# Patient Record
Sex: MALE | Race: WHITE | NOT HISPANIC OR LATINO | Employment: OTHER | URBAN - METROPOLITAN AREA
[De-identification: names, ages, dates, MRNs, and addresses within clinical notes are randomized per-mention and may not be internally consistent; named-entity substitution may affect disease eponyms.]

---

## 2017-01-16 ENCOUNTER — TELEPHONE (OUTPATIENT)
Dept: NEUROLOGY | Facility: CLINIC | Age: 47
End: 2017-01-16

## 2017-01-16 NOTE — TELEPHONE ENCOUNTER
----- Message from Sheriheather Long sent at 1/16/2017 11:55 AM CST -----  _x  1st Request  _  2nd Request  _  3rd Request        Who:  Dodie Pinon    Why: Dr. Jay Jay Bryson spoke to Dr. Taylor about getting the patient in as soon as possible to see him. Please contact Mrs. Dodie Pinon. To schedule     What Number to Call Back: [phone]    When to Expect a call back: (Before the end of the day)   -- if call after 3:00 call back will be tomorrow.

## 2017-01-17 ENCOUNTER — OFFICE VISIT (OUTPATIENT)
Dept: NEUROLOGY | Facility: CLINIC | Age: 47
End: 2017-01-17
Payer: COMMERCIAL

## 2017-01-17 VITALS
DIASTOLIC BLOOD PRESSURE: 65 MMHG | SYSTOLIC BLOOD PRESSURE: 99 MMHG | WEIGHT: 256.81 LBS | HEIGHT: 75 IN | BODY MASS INDEX: 31.93 KG/M2 | HEART RATE: 62 BPM

## 2017-01-17 DIAGNOSIS — I10 ESSENTIAL HYPERTENSION: ICD-10-CM

## 2017-01-17 DIAGNOSIS — R41.3 AMNESTIC DISORDER: ICD-10-CM

## 2017-01-17 PROCEDURE — 99204 OFFICE O/P NEW MOD 45 MIN: CPT | Mod: S$GLB,,, | Performed by: PSYCHIATRY & NEUROLOGY

## 2017-01-17 PROCEDURE — 3078F DIAST BP <80 MM HG: CPT | Mod: S$GLB,,, | Performed by: PSYCHIATRY & NEUROLOGY

## 2017-01-17 PROCEDURE — 99999 PR PBB SHADOW E&M-EST. PATIENT-LVL III: CPT | Mod: PBBFAC,,, | Performed by: PSYCHIATRY & NEUROLOGY

## 2017-01-17 PROCEDURE — 1159F MED LIST DOCD IN RCRD: CPT | Mod: S$GLB,,, | Performed by: PSYCHIATRY & NEUROLOGY

## 2017-01-17 PROCEDURE — 3074F SYST BP LT 130 MM HG: CPT | Mod: S$GLB,,, | Performed by: PSYCHIATRY & NEUROLOGY

## 2017-01-17 RX ORDER — LISINOPRIL 10 MG/1
10 TABLET ORAL DAILY
Refills: 0 | COMMUNITY
Start: 2017-01-04 | End: 2021-07-06

## 2017-01-17 RX ORDER — ZIPRASIDONE HYDROCHLORIDE 40 MG/1
80 CAPSULE ORAL NIGHTLY
Refills: 0 | COMMUNITY
Start: 2017-01-04 | End: 2021-07-06

## 2017-01-17 NOTE — LETTER
January 17, 2017      Jay Jay Bryson MD  [address]  [facility] [address]           [facility] - Neurology  [address]  [facility] [address]  Phone: [phone]  Fax: [phone]          Patient: Jeffrey Pinon   MR Number: [id number]   YOB: 1970   Date of Visit: 1/17/2017       Dear Dr. Jay Jay Bryson:    Thank you for referring Jeffrey Pinon to me for evaluation. Attached you will find relevant portions of my assessment and plan of care.    If you have questions, please do not hesitate to call me. I look forward to following Jeffrey Pinon along with you.    Sincerely,    Nik Francois MD    Enclosure  CC:  No Recipients    If you would like to receive this communication electronically, please contact [email or web address] or [phone] to request more information on [facility]methods Link access.    For providers and/or their staff who would like to refer a patient to [facility], please contact us through our one-stop-shop provider referral line, [facility], at [phone].    If you feel you have received this communication in error or would no longer like to receive these types of communications, please e-mail [email or web address]

## 2017-01-17 NOTE — PROGRESS NOTES
Subjective:       Patient ID: Jeffrey Pinon is a 46 y.o. male.    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  The patient was initially referred to Dr. Taylor, however could not be seen by him today because of an emergency and was seen by me instead.  The patient comes to the clinic alone initially, and is the primary historian.  His mother was present in the emergency room.  He reports that September 2016 he was noted to have been sending inappropriate texts that he cannot recall sending.  He was subsequently evaluated for this.  He reports having seen a neurologist in [place] and had a complete workup done including blood work, MRI scan of brain without contrast and an EEG, which the patient reports were normal.  He was subsequently seen by psychiatry and admitted for an evaluation.  He reports having had neuropsychological testing done the results of which are not available.  He did try to go back to work however the same pattern of behavior had recurred and it texting ability on his phone was removed.  The patient is presently under the care of a psychiatrist, Dr. Jay Jay Bella, and is on [facility].  No medical records were available for review.  Because of the unique nature of symptoms and age of onset, a psychiatrist wanted a more thorough neurological evaluation and possibly a spinal tap.  The patients mother was also interviewed after the patients examination and she collaborated with the patients history.  The patient has otherwise been in good health.  He does have history of hypertension that is well controlled on lisinopril.  He is presently .  He lives alone in a condo in [place] at this time.  He has not yet returned to work.       Review of Systems  Review of Systems   Constitutional: Negative.    HENT: Negative.  Negative for hearing loss.     Eyes: Negative.  Negative for visual disturbance.   Respiratory: Negative.  Negative for shortness of breath.    Cardiovascular: Negative.  Negative for chest pain and palpitations.   Gastrointestinal: Negative.    Endocrine: Negative.    Genitourinary: Negative.    Musculoskeletal: Negative.  Negative for back pain and gait problem.   Skin: Negative.    Allergic/Immunologic: Negative.    Neurological: Negative.  Negative for dizziness, tremors, seizures, syncope, speech difficulty, weakness, numbness and headaches.   Hematological: Negative.    Psychiatric/Behavioral: Negative.  Negative for decreased concentration and sleep disturbance.       Objective:      Neurologic Exam     Mental Status   Oriented to person, place, and time.   Registration: recalls 3 of 3 objects. Follows 3 step commands.   Attention: normal. Concentration: normal.   Speech: speech is normal   Level of consciousness: alert  Knowledge: good.   Able to name object. Able to read. Able to repeat. Able to write. Normal comprehension.     Cranial Nerves   Cranial nerves II through XII intact.     Motor Exam   Muscle bulk: normal  Overall muscle tone: normal    Strength   Strength 5/5 throughout.     Sensory Exam   Light touch normal.   Vibration normal.   Proprioception normal.   Pinprick normal.     Gait, Coordination, and Reflexes     Gait  Gait: normal    Coordination   Romberg: negative  Finger to nose coordination: normal  Heel to shin coordination: normal  Tandem walking coordination: normal    Tremor   Resting tremor: absent  Intention tremor: absent  Action tremor: absent    Reflexes   Right brachioradialis: 2+  Left brachioradialis: 2+  Right biceps: 2+  Left biceps: 2+  Right triceps: 2+  Left triceps: 2+  Right patellar: 2+  Left patellar: 2+  Right achilles: 2+  Left achilles: 2+  Right plantar: normal  Left plantar: normal      Physical Exam   Constitutional: He is oriented to person, place, and time. He appears well-developed and  well-nourished.   HENT:   Head: Normocephalic and atraumatic.   Neck: Normal range of motion. Neck supple.   Neurological: He is oriented to person, place, and time. He has normal strength. He has a normal Finger-Nose-Finger Test, a normal Heel to Shin Test, a normal Romberg Test and a normal Tandem Gait Test. Gait normal.   Reflex Scores:       Tricep reflexes are 2+ on the right side and 2+ on the left side.       Bicep reflexes are 2+ on the right side and 2+ on the left side.       Brachioradialis reflexes are 2+ on the right side and 2+ on the left side.       Patellar reflexes are 2+ on the right side and 2+ on the left side.       Achilles reflexes are 2+ on the right side and 2+ on the left side.  Psychiatric: His speech is normal.   Vitals reviewed.        Assessment:        1. Amnestic disorder    2. Essential hypertension            Plan:         Discussed with patient and mother.  Will get an MRI scan of the brain with contrast which is only test that has not been done.  We will get results of the MRI scan without contrast and the EEG done by his neurologist, and blood work done by his neurologist at that time.  Also advised patient to get a copy of his neuropsychological testing results faxed to the office.  The patient will follow-up with Dr. Taylor in 2-4 weeks after medical records are available for review, for further investigation if appropriate.

## 2017-01-17 NOTE — PATIENT INSTRUCTIONS
Discussed with patient and mother.  Will get an MRI scan of the brain with contrast which is only test that has not been done.  We will get results of the MRI scan without contrast and the EEG done by his neurologist, and blood work done by his neurologist at that time.  Also advised patient to get a copy of his neuropsychological testing results faxed to the office.  The patient will follow-up with Dr. Taylor in 2-4 weeks after medical records are available for review, for further investigation if appropriate.

## 2017-01-19 ENCOUNTER — TELEPHONE (OUTPATIENT)
Dept: NEUROLOGY | Facility: CLINIC | Age: 47
End: 2017-01-19

## 2017-01-19 DIAGNOSIS — R41.3 AMNESTIC DISORDER: Primary | ICD-10-CM

## 2017-01-19 NOTE — TELEPHONE ENCOUNTER
----- Message from Stefany Mao sent at 1/19/2017  3:20 PM CST -----  Contact: Carmen with Radiology  X   1st Request  _  2nd Request  _  3rd Request        Who:  Carmen with Radiology    Why: Carmen states pt needs a Bun/Creatinine Lab work. Pt has an MRI appt on 01/23. Please call with any questions, thanks!    What Number to Call Back: Ext [phone]    When to Expect a call back: (Before the end of the day)   -- if call after 3:00 call back will be tomorrow.

## 2017-01-24 ENCOUNTER — TELEPHONE (OUTPATIENT)
Dept: NEUROLOGY | Facility: CLINIC | Age: 47
End: 2017-01-24

## 2017-01-24 NOTE — TELEPHONE ENCOUNTER
----- Message from Kimmy Yuan sent at 1/24/2017 10:00 AM CST -----  Contact: Dodie Pinon (mother)  _x  1st Request  _  2nd Request  _  3rd Request        Who: Dodie Pinon (mother)    Why: patient's mother would like to reschedule MRI and labs. Please give patient's mother a call back at your earliest convenience. Thanks!     What Number to Call Back [phone]    When to Expect a call back: (Before the end of the day)   -- if call after 3:00 call back will be tomorrow.

## 2017-01-25 ENCOUNTER — HOSPITAL ENCOUNTER (OUTPATIENT)
Dept: RADIOLOGY | Facility: OTHER | Age: 47
Discharge: HOME | End: 2017-01-25
Attending: PSYCHIATRY & NEUROLOGY
Payer: COMMERCIAL

## 2017-01-25 DIAGNOSIS — R41.3 AMNESTIC DISORDER: ICD-10-CM

## 2017-01-25 PROCEDURE — A9585 GADOBUTROL INJECTION: HCPCS | Performed by: PSYCHIATRY & NEUROLOGY

## 2017-01-25 PROCEDURE — 70553 MRI BRAIN STEM W/O & W/DYE: CPT | Mod: 26,,, | Performed by: RADIOLOGY

## 2017-01-25 PROCEDURE — 25500020 PHARM REV CODE 255: Performed by: PSYCHIATRY & NEUROLOGY

## 2017-01-25 PROCEDURE — 70553 MRI BRAIN STEM W/O & W/DYE: CPT | Mod: TC

## 2017-01-25 RX ORDER — GADOBUTROL 604.72 MG/ML
10 INJECTION INTRAVENOUS
Status: COMPLETED | OUTPATIENT
Start: 2017-01-25 | End: 2017-01-25

## 2017-01-25 RX ADMIN — GADOBUTROL 10 ML: 604.72 INJECTION INTRAVENOUS at 03:01

## 2017-01-26 ENCOUNTER — TELEPHONE (OUTPATIENT)
Dept: NEUROLOGY | Facility: CLINIC | Age: 47
End: 2017-01-26

## 2017-07-18 ENCOUNTER — HOSPITAL ENCOUNTER (EMERGENCY)
Facility: HOSPITAL | Age: 47
End: 2017-07-19
Attending: EMERGENCY MEDICINE
Payer: COMMERCIAL

## 2017-07-18 VITALS
DIASTOLIC BLOOD PRESSURE: 62 MMHG | HEART RATE: 63 BPM | TEMPERATURE: 98 F | BODY MASS INDEX: 26.61 KG/M2 | SYSTOLIC BLOOD PRESSURE: 119 MMHG | RESPIRATION RATE: 16 BRPM | OXYGEN SATURATION: 98 % | WEIGHT: 214 LBS | HEIGHT: 75 IN

## 2017-07-18 DIAGNOSIS — R45.851 SUICIDAL IDEATION: Primary | ICD-10-CM

## 2017-07-18 LAB
ALBUMIN SERPL BCP-MCNC: 3.7 G/DL
ALP SERPL-CCNC: 43 U/L
ALT SERPL W/O P-5'-P-CCNC: 18 U/L
AMPHET+METHAMPHET UR QL: NEGATIVE
ANION GAP SERPL CALC-SCNC: 10 MMOL/L
APAP SERPL-MCNC: <3 UG/ML
AST SERPL-CCNC: 13 U/L
BARBITURATES UR QL SCN>200 NG/ML: NEGATIVE
BASOPHILS # BLD AUTO: 0 K/UL
BASOPHILS NFR BLD: 0.4 %
BENZODIAZ UR QL SCN>200 NG/ML: NEGATIVE
BILIRUB SERPL-MCNC: 0.3 MG/DL
BILIRUB UR QL STRIP: NEGATIVE
BUN SERPL-MCNC: 14 MG/DL
BZE UR QL SCN: NEGATIVE
CALCIUM SERPL-MCNC: 8.8 MG/DL
CANNABINOIDS UR QL SCN: NEGATIVE
CHLORIDE SERPL-SCNC: 105 MMOL/L
CLARITY UR: CLEAR
CO2 SERPL-SCNC: 24 MMOL/L
COLOR UR: YELLOW
CREAT SERPL-MCNC: 1 MG/DL
CREAT UR-MCNC: 98.8 MG/DL
DIFFERENTIAL METHOD: ABNORMAL
EOSINOPHIL # BLD AUTO: 0.1 K/UL
EOSINOPHIL NFR BLD: 0.9 %
ERYTHROCYTE [DISTWIDTH] IN BLOOD BY AUTOMATED COUNT: 12.5 %
EST. GFR  (AFRICAN AMERICAN): >60 ML/MIN/1.73 M^2
EST. GFR  (NON AFRICAN AMERICAN): >60 ML/MIN/1.73 M^2
ETHANOL SERPL-MCNC: <10 MG/DL
GLUCOSE SERPL-MCNC: 98 MG/DL
GLUCOSE UR QL STRIP: NEGATIVE
HCT VFR BLD AUTO: 38.9 %
HGB BLD-MCNC: 13.4 G/DL
HGB UR QL STRIP: NEGATIVE
KETONES UR QL STRIP: NEGATIVE
LEUKOCYTE ESTERASE UR QL STRIP: NEGATIVE
LYMPHOCYTES # BLD AUTO: 1.6 K/UL
LYMPHOCYTES NFR BLD: 17.5 %
MCH RBC QN AUTO: 31.1 PG
MCHC RBC AUTO-ENTMCNC: 34.4 %
MCV RBC AUTO: 90 FL
METHADONE UR QL SCN>300 NG/ML: NEGATIVE
MONOCYTES # BLD AUTO: 0.5 K/UL
MONOCYTES NFR BLD: 5.3 %
NEUTROPHILS # BLD AUTO: 7 K/UL
NEUTROPHILS NFR BLD: 75.9 %
NITRITE UR QL STRIP: NEGATIVE
OPIATES UR QL SCN: NEGATIVE
PCP UR QL SCN>25 NG/ML: NEGATIVE
PH UR STRIP: 6 [PH] (ref 5–8)
PLATELET # BLD AUTO: 222 K/UL
PMV BLD AUTO: 7.4 FL
POTASSIUM SERPL-SCNC: 3.6 MMOL/L
PROT SERPL-MCNC: 6.4 G/DL
PROT UR QL STRIP: NEGATIVE
RBC # BLD AUTO: 4.3 M/UL
SALICYLATES SERPL-MCNC: <5 MG/DL
SODIUM SERPL-SCNC: 139 MMOL/L
SP GR UR STRIP: 1.01 (ref 1–1.03)
TOXICOLOGY INFORMATION: NORMAL
TSH SERPL DL<=0.005 MIU/L-ACNC: 0.76 UIU/ML
URN SPEC COLLECT METH UR: NORMAL
UROBILINOGEN UR STRIP-ACNC: NEGATIVE EU/DL
WBC # BLD AUTO: 9.2 K/UL

## 2017-07-18 PROCEDURE — 80329 ANALGESICS NON-OPIOID 1 OR 2: CPT

## 2017-07-18 PROCEDURE — 84443 ASSAY THYROID STIM HORMONE: CPT

## 2017-07-18 PROCEDURE — 25000003 PHARM REV CODE 250: Performed by: EMERGENCY MEDICINE

## 2017-07-18 PROCEDURE — 99285 EMERGENCY DEPT VISIT HI MDM: CPT

## 2017-07-18 PROCEDURE — 85025 COMPLETE CBC W/AUTO DIFF WBC: CPT

## 2017-07-18 PROCEDURE — 81003 URINALYSIS AUTO W/O SCOPE: CPT

## 2017-07-18 PROCEDURE — 36415 COLL VENOUS BLD VENIPUNCTURE: CPT

## 2017-07-18 PROCEDURE — 80307 DRUG TEST PRSMV CHEM ANLYZR: CPT

## 2017-07-18 PROCEDURE — 93010 ELECTROCARDIOGRAM REPORT: CPT | Mod: ,,, | Performed by: INTERNAL MEDICINE

## 2017-07-18 PROCEDURE — 80320 DRUG SCREEN QUANTALCOHOLS: CPT

## 2017-07-18 PROCEDURE — 80053 COMPREHEN METABOLIC PANEL: CPT

## 2017-07-18 PROCEDURE — 93005 ELECTROCARDIOGRAM TRACING: CPT

## 2017-07-18 RX ORDER — ZIPRASIDONE HYDROCHLORIDE 20 MG/1
80 CAPSULE ORAL
Status: COMPLETED | OUTPATIENT
Start: 2017-07-18 | End: 2017-07-18

## 2017-07-18 RX ORDER — FLUOXETINE 10 MG/1
10 CAPSULE ORAL DAILY
COMMUNITY
End: 2021-07-06

## 2017-07-18 RX ADMIN — ZIPRASIDONE HYDROCHLORIDE 80 MG: 20 CAPSULE ORAL at 10:07

## 2017-07-18 NOTE — ED PROVIDER NOTES
Encounter Date: 7/18/2017    SCRIBE #1 NOTE: I, Pam Snyder, am scribing for, and in the presence of, Dr. Camara.       History     Chief Complaint   Patient presents with    Psychiatric Evaluation     Severe anxiety  from haldol injection he received last week. Suicidal ideations     07/18/2017  6:45 PM     Chief Complaint: Psychiatric Evaluation       The patient is a 47 y.o. male who is presenting to ED for psychiatric evaluation today. Pt reports that he received 28 day dose of Haldol injection due to delusion disorder since last week. Pt's father reports that pt was dx with delusional disorder in 10/2016. He reports anxiety since injection. He reports SI without plan since two days ago. He denies HI or auditory hallucination. He denies prior hx of self-injury. Pt was seen by his psychiatrist yesterday and started pt on Prozac. Pt has a PMHx of delusional disorder and HTN. Pt has no PSHx on file.        The history is provided by the patient and a parent.     Review of patient's allergies indicates:   Allergen Reactions    Warfarin      Past Medical History:   Diagnosis Date    Delusional disorder     Hypertension      History reviewed. No pertinent surgical history.  Family History   Problem Relation Age of Onset    Hypertension Father     Clotting disorder Father      Protein C deficiency     Social History   Substance Use Topics    Smoking status: Never Smoker    Smokeless tobacco: Never Used    Alcohol use No     Review of Systems   Constitutional: Negative for fever.   HENT: Negative for sore throat.    Respiratory: Negative for shortness of breath.    Cardiovascular: Negative for chest pain.   Gastrointestinal: Negative for nausea.   Genitourinary: Negative for dysuria.   Musculoskeletal: Negative for back pain.   Skin: Negative for rash.   Neurological: Negative for weakness.   Hematological: Does not bruise/bleed easily.   Psychiatric/Behavioral: Positive for suicidal ideas. Negative for  hallucinations and self-injury. The patient is nervous/anxious.        Physical Exam     Initial Vitals [07/18/17 1819]   BP Pulse Resp Temp SpO2   (!) 154/86 74 16 98.3 °F (36.8 °C) 99 %      MAP       108.67         Physical Exam    Nursing note and vitals reviewed.  Constitutional: He appears well-developed and well-nourished. He is not diaphoretic. No distress.   HENT:   Head: Normocephalic and atraumatic.   Eyes: EOM are normal. Pupils are equal, round, and reactive to light.   Neck: Normal range of motion. Neck supple.   Cardiovascular: Normal rate, regular rhythm, normal heart sounds and intact distal pulses.   Pulmonary/Chest: Breath sounds normal. No respiratory distress. He has no wheezes. He has no rhonchi. He has no rales.   Abdominal: Soft. Bowel sounds are normal. There is no tenderness. There is no rebound and no guarding.   Musculoskeletal: Normal range of motion.   Neurological: He is alert and oriented to person, place, and time.   Skin: Skin is warm.   Psychiatric:   Flat affect.          ED Course   Procedures  Labs Reviewed   CBC W/ AUTO DIFFERENTIAL - Abnormal; Notable for the following:        Result Value    RBC 4.30 (*)     Hemoglobin 13.4 (*)     Hematocrit 38.9 (*)     MCH 31.1 (*)     MPV 7.4 (*)     Gran% 75.9 (*)     Lymph% 17.5 (*)     All other components within normal limits   COMPREHENSIVE METABOLIC PANEL - Abnormal; Notable for the following:     Alkaline Phosphatase 43 (*)     All other components within normal limits   ACETAMINOPHEN LEVEL - Abnormal; Notable for the following:     Acetaminophen (Tylenol), Serum <3.0 (*)     All other components within normal limits   SALICYLATE LEVEL - Abnormal; Notable for the following:     Salicylate Lvl <5.0 (*)     All other components within normal limits   TSH   URINALYSIS   DRUG SCREEN PANEL, URINE EMERGENCY   ALCOHOL,MEDICAL (ETHANOL)             Medical Decision Making:   Initial Assessment:   This is an emergent evaluation of an 47  y.o. presenting with psychiatric evaluation.   Differential Diagnosis:   My differential diagnosis includes SI, Medication Reaction, Alcohol Intoxication, and Drug Intoxication.   ED Management:  The patient was emergently evaluated in the ED, his evaluation was significant for a middle-aged male who is expressing active suicidal thoughts.  The patient was placed under the care of a physician's emergency certificate for his safety.  The patient's EKG showed no acute abnormalities per my independent interpretation.  The patient's labs showed no acute abnormalities.  The patient's diagnosis is suicidal ideation.  I believe the patient will require psychiatric evaluation and treatment.  He is medically cleared for placement into an inpatient psychiatric facility.            Scribe Attestation:   Scribe #1: I performed the above scribed service and the documentation accurately describes the services I performed. I attest to the accuracy of the note.    Attending Attestation:           Physician Attestation for Scribe:  Physician Attestation Statement for Scribe #1: I, Dr. Camara, reviewed documentation, as scribed by Pam Snyder in my presence, and it is both accurate and complete.                 ED Course     Clinical Impression:     1. Suicidal ideation                                 Lamont Camara MD  07/18/17 5865

## 2017-07-19 NOTE — ED NOTES
Admit packet faxed to the following facilities:  [facility], [facility], [facility], [facility], [facility], [facility], [facility], [facility], [facility], [facility], [facility], [facility], [facility], [facility], [facility], [facility], [facility], [facility], [facility], [facility], [facility], [facility], [facility], [facility], [facility], [facility], [facility], [facility] and [facility].

## 2017-07-19 NOTE — ED NOTES
Patient accepted to [facility] in [place].  Accepting Md is Dr. Kuo. Call report in 30 minutes to [phone].

## 2017-07-19 NOTE — ED NOTES
Pt reports recent injection of haldol and 2 days later began feeling suicidal. Pt has no definite plan, just feelings. No hallucinations auditory or visual. Pt has been treated for delusional disorder in the past. Pt has had this same type reaction to his first injection of haldol and physician was aware according to pt. This was his second haldol injection. 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 Pt calm and cooperative at this time. Pt refuses to void as he says that is a reaction to the haldol. Pt requesting sedation MD advised.

## 2021-06-24 ENCOUNTER — TELEPHONE (OUTPATIENT)
Dept: FAMILY MEDICINE | Facility: CLINIC | Age: 51
End: 2021-06-24

## 2021-07-06 ENCOUNTER — OFFICE VISIT (OUTPATIENT)
Dept: FAMILY MEDICINE | Facility: CLINIC | Age: 51
End: 2021-07-06
Payer: MEDICARE

## 2021-07-06 VITALS
HEART RATE: 68 BPM | WEIGHT: 232 LBS | SYSTOLIC BLOOD PRESSURE: 120 MMHG | HEIGHT: 75 IN | BODY MASS INDEX: 28.85 KG/M2 | DIASTOLIC BLOOD PRESSURE: 78 MMHG

## 2021-07-06 DIAGNOSIS — I10 HYPERTENSION, UNSPECIFIED TYPE: Primary | ICD-10-CM

## 2021-07-06 DIAGNOSIS — M77.8 TENDONITIS OF ELBOW, RIGHT: ICD-10-CM

## 2021-07-06 DIAGNOSIS — R41.3 AMNESTIC DISORDER: ICD-10-CM

## 2021-07-06 PROCEDURE — 3074F SYST BP LT 130 MM HG: CPT | Mod: S$GLB,,, | Performed by: FAMILY MEDICINE

## 2021-07-06 PROCEDURE — 99204 PR OFFICE/OUTPT VISIT, NEW, LEVL IV, 45-59 MIN: ICD-10-PCS | Mod: S$GLB,,, | Performed by: FAMILY MEDICINE

## 2021-07-06 PROCEDURE — 3008F PR BODY MASS INDEX (BMI) DOCUMENTED: ICD-10-PCS | Mod: S$GLB,,, | Performed by: FAMILY MEDICINE

## 2021-07-06 PROCEDURE — 3008F BODY MASS INDEX DOCD: CPT | Mod: S$GLB,,, | Performed by: FAMILY MEDICINE

## 2021-07-06 PROCEDURE — 3078F DIAST BP <80 MM HG: CPT | Mod: S$GLB,,, | Performed by: FAMILY MEDICINE

## 2021-07-06 PROCEDURE — 3074F PR MOST RECENT SYSTOLIC BLOOD PRESSURE < 130 MM HG: ICD-10-PCS | Mod: S$GLB,,, | Performed by: FAMILY MEDICINE

## 2021-07-06 PROCEDURE — 99204 OFFICE O/P NEW MOD 45 MIN: CPT | Mod: S$GLB,,, | Performed by: FAMILY MEDICINE

## 2021-07-06 PROCEDURE — 3078F PR MOST RECENT DIASTOLIC BLOOD PRESSURE < 80 MM HG: ICD-10-PCS | Mod: S$GLB,,, | Performed by: FAMILY MEDICINE

## 2021-07-06 RX ORDER — LISINOPRIL 40 MG/1
40 TABLET ORAL DAILY
COMMUNITY
End: 2021-07-06

## 2021-07-06 RX ORDER — LISINOPRIL 40 MG/1
40 TABLET ORAL DAILY
Qty: 90 TABLET | Refills: 1 | Status: SHIPPED | OUTPATIENT
Start: 2021-07-06 | End: 2022-01-06 | Stop reason: SDUPTHER

## 2021-07-06 RX ORDER — LISINOPRIL 40 MG/1
40 TABLET ORAL DAILY
Qty: 90 TABLET | Refills: 1 | Status: SHIPPED | OUTPATIENT
Start: 2021-07-06 | End: 2021-07-06 | Stop reason: SDUPTHER

## 2021-07-06 RX ORDER — OLANZAPINE 10 MG/1
10 TABLET ORAL NIGHTLY
COMMUNITY

## 2021-07-06 RX ORDER — LORATADINE 10 MG/1
10 TABLET ORAL DAILY
COMMUNITY

## 2021-07-08 ENCOUNTER — TELEPHONE (OUTPATIENT)
Dept: FAMILY MEDICINE | Facility: CLINIC | Age: 51
End: 2021-07-08

## 2022-01-06 ENCOUNTER — OFFICE VISIT (OUTPATIENT)
Dept: FAMILY MEDICINE | Facility: CLINIC | Age: 52
End: 2022-01-06
Payer: MEDICARE

## 2022-01-06 VITALS
BODY MASS INDEX: 28.47 KG/M2 | WEIGHT: 229 LBS | DIASTOLIC BLOOD PRESSURE: 76 MMHG | HEIGHT: 75 IN | HEART RATE: 66 BPM | SYSTOLIC BLOOD PRESSURE: 120 MMHG

## 2022-01-06 DIAGNOSIS — R41.3 AMNESTIC DISORDER: ICD-10-CM

## 2022-01-06 DIAGNOSIS — I10 HYPERTENSION, UNSPECIFIED TYPE: ICD-10-CM

## 2022-01-06 DIAGNOSIS — F20.89 OTHER SCHIZOPHRENIA: ICD-10-CM

## 2022-01-06 DIAGNOSIS — Z00.00 WELLNESS EXAMINATION: Primary | ICD-10-CM

## 2022-01-06 PROCEDURE — 99214 PR OFFICE/OUTPT VISIT, EST, LEVL IV, 30-39 MIN: ICD-10-PCS | Mod: S$GLB,,, | Performed by: FAMILY MEDICINE

## 2022-01-06 PROCEDURE — 3008F BODY MASS INDEX DOCD: CPT | Mod: S$GLB,,, | Performed by: FAMILY MEDICINE

## 2022-01-06 PROCEDURE — 3074F PR MOST RECENT SYSTOLIC BLOOD PRESSURE < 130 MM HG: ICD-10-PCS | Mod: S$GLB,,, | Performed by: FAMILY MEDICINE

## 2022-01-06 PROCEDURE — 3078F DIAST BP <80 MM HG: CPT | Mod: S$GLB,,, | Performed by: FAMILY MEDICINE

## 2022-01-06 PROCEDURE — 99214 OFFICE O/P EST MOD 30 MIN: CPT | Mod: S$GLB,,, | Performed by: FAMILY MEDICINE

## 2022-01-06 PROCEDURE — 3074F SYST BP LT 130 MM HG: CPT | Mod: S$GLB,,, | Performed by: FAMILY MEDICINE

## 2022-01-06 PROCEDURE — 3078F PR MOST RECENT DIASTOLIC BLOOD PRESSURE < 80 MM HG: ICD-10-PCS | Mod: S$GLB,,, | Performed by: FAMILY MEDICINE

## 2022-01-06 PROCEDURE — 3008F PR BODY MASS INDEX (BMI) DOCUMENTED: ICD-10-PCS | Mod: S$GLB,,, | Performed by: FAMILY MEDICINE

## 2022-01-06 RX ORDER — LISINOPRIL 40 MG/1
40 TABLET ORAL DAILY
Qty: 90 TABLET | Refills: 1 | Status: SHIPPED | OUTPATIENT
Start: 2022-01-06 | End: 2022-07-07 | Stop reason: SDUPTHER

## 2022-01-06 NOTE — PROGRESS NOTES
SUBJECTIVE:    Patient ID: Jeffrey Pinon is a 51 y.o. male.    Chief Complaint: Follow-up (No bottles //need one refill // f/u 6 mo //Pt declined Flu shot//abc)    This 51-year-old male with hypertension, currently under the care of psychiatrist Dr. Azar in [place].  He sees him on a telemedicine visit monthly for his schizophrenia.  Very stable on Zyprexa although patient complains of gaining weight on it.  He is actively working out at the gym with a  3 days a week and does 2 days a week with cardio doing elliptical machine.  He has lost 3 lb since his last visit.    Denies dizziness shortness of breath chest pain.  Feels well in general.  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      No visits with results within 6 Month(s) from this visit.   Latest known visit with results is:   Admission on 07/18/2017, Discharged on 07/19/2017   Component Date Value Ref Range Status    WBC 07/18/2017 9.20  3.90 - 12.70 K/uL Final    RBC 07/18/2017 4.30* 4.60 - 6.20 M/uL Final    Hemoglobin 07/18/2017 13.4* 14.0 - 18.0 g/dL Final    Hematocrit 07/18/2017 38.9* 40.0 - 54.0 % Final    MCV 07/18/2017 90  82 - 98 fL Final    MCH 07/18/2017 31.1* 27.0 - 31.0 pg Final    MCHC 07/18/2017 34.4  32.0 - 36.0 % Final    RDW 07/18/2017 12.5  11.5 - 14.5 % Final    Platelets 07/18/2017 222  150 - 350 K/uL Final    MPV 07/18/2017 7.4* 9.2 - 12.9 fL Final    Gran # (ANC) 07/18/2017 7.0  1.8 - 7.7 K/uL Final    Lymph # 07/18/2017 1.6  1.0 - 4.8 K/uL Final    Mono # 07/18/2017 0.5  0.3 - 1.0 K/uL Final    Eos # 07/18/2017 0.1  0.0 - 0.5 K/uL Final    Baso # 07/18/2017 0.00  0.00 - 0.20 K/uL Final    Gran % 07/18/2017 75.9* 38.0 - 73.0 % Final    Lymph % 07/18/2017 17.5* 18.0 - 48.0 % Final    Mono % 07/18/2017 5.3  4.0 - 15.0 % Final    Eosinophil % 07/18/2017 0.9  0.0 - 8.0 % Final    Basophil % 07/18/2017 0.4  0.0 - 1.9 % Final    Differential Method 07/18/2017 Automated    Final    Sodium 07/18/2017 139  136 - 145 mmol/L Final    Potassium 07/18/2017 3.6  3.5 - 5.1 mmol/L Final    Chloride 07/18/2017 105  95 - 110 mmol/L Final    CO2 07/18/2017 24  23 - 29 mmol/L Final    Glucose 07/18/2017 98  70 - 110 mg/dL Final    BUN 07/18/2017 14  6 - 20 mg/dL Final    Creatinine 07/18/2017 1.0  0.5 - 1.4 mg/dL Final    Calcium 07/18/2017 8.8  8.7 - 10.5 mg/dL Final    Total Protein 07/18/2017 6.4  6.0 - 8.4 g/dL Final    Albumin 07/18/2017 3.7  3.5 - 5.2 g/dL Final    Total Bilirubin 07/18/2017 0.3  0.1 - 1.0 mg/dL Final    Alkaline Phosphatase 07/18/2017 43* 55 - 135 U/L Final    AST 07/18/2017 13  10 - 40 U/L Final    ALT 07/18/2017 18  10 - 44 U/L Final    Anion Gap 07/18/2017 10  8 - 16 mmol/L Final    eGFR if African American 07/18/2017 >60  >60 mL/min/1.73 m^2 Final    eGFR if non African American 07/18/2017 >60  >60 mL/min/1.73 m^2 Final    TSH 07/18/2017 0.760  0.400 - 4.000 uIU/mL Final    Specimen UA 07/18/2017 Urine, Unspecified   Final    Color, UA 07/18/2017 Yellow  Yellow, Straw, Alejandra Final    Appearance, UA 07/18/2017 Clear  Clear Final    pH, UA 07/18/2017 6.0  5.0 - 8.0 Final    Specific Gravity, UA 07/18/2017 1.015  1.005 - 1.030 Final    Protein, UA 07/18/2017 Negative  Negative Final    Glucose, UA 07/18/2017 Negative  Negative Final    Ketones, UA 07/18/2017 Negative  Negative Final    Bilirubin (UA) 07/18/2017 Negative  Negative Final    Occult Blood UA 07/18/2017 Negative  Negative Final    Nitrite, UA 07/18/2017 Negative  Negative Final    Urobilinogen, UA 07/18/2017 Negative  <2.0 EU/dL Final    Leukocytes, UA 07/18/2017 Negative  Negative Final    Benzodiazepines 07/18/2017 Negative   Final    Methadone metabolites 07/18/2017 Negative   Final    Cocaine (Metab.) 07/18/2017 Negative   Final    Opiate Scrn, Ur 07/18/2017 Negative   Final    Barbiturate Screen, Ur 07/18/2017 Negative   Final    Amphetamine Screen, Ur 07/18/2017  Negative   Final    THC 07/18/2017 Negative   Final    Phencyclidine 07/18/2017 Negative   Final    Creatinine, Urine 07/18/2017 98.8  23.0 - 375.0 mg/dL Final    Toxicology Information 07/18/2017 SEE COMMENT   Final    Alcohol, Serum 07/18/2017 <10  <10 mg/dL Final    Acetaminophen (Tylenol), Serum 07/18/2017 <3.0* 10.0 - 20.0 ug/mL Final    Salicylate Lvl 07/18/2017 <5.0* 15.0 - 30.0 mg/dL Final       Past Medical History:   Diagnosis Date    Delusional disorder     Hypertension      Social History     Socioeconomic History    Marital status:    Tobacco Use    Smoking status: Never Smoker    Smokeless tobacco: Never Used   Substance and Sexual Activity    Alcohol use: No    Drug use: No     No past surgical history on file.  Family History   Problem Relation Age of Onset    Hypertension Father     Clotting disorder Father         Protein C deficiency       Review of patient's allergies indicates:   Allergen Reactions    Warfarin        Current Outpatient Medications:     loratadine (CLARITIN) 10 mg tablet, Take 10 mg by mouth once daily., Disp: , Rfl:     OLANZapine (ZYPREXA) 10 MG tablet, Take 10 mg by mouth every evening., Disp: , Rfl:     lisinopriL (PRINIVIL,ZESTRIL) 40 MG tablet, Take 1 tablet (40 mg total) by mouth once daily., Disp: 90 tablet, Rfl: 1    Review of Systems   Constitutional: Negative for appetite change, chills, fatigue, fever and unexpected weight change.   HENT: Negative for congestion, ear pain and trouble swallowing.    Eyes: Negative for pain, discharge and visual disturbance.   Respiratory: Negative for apnea, cough, shortness of breath and wheezing.    Cardiovascular: Negative for chest pain and leg swelling.   Gastrointestinal: Negative for abdominal pain, blood in stool, constipation, diarrhea, nausea and vomiting.   Endocrine: Negative for cold intolerance, heat intolerance and polydipsia.   Genitourinary: Negative for dysuria, hematuria, testicular pain  "and urgency.   Musculoskeletal: Negative for gait problem, joint swelling and myalgias.   Neurological: Negative for dizziness, seizures and numbness.   Psychiatric/Behavioral: Positive for behavioral problems (History of schizophrenia). Negative for agitation and hallucinations. The patient is not nervous/anxious.           Objective:      Vitals:    01/06/22 1009   BP: 120/76   Pulse: 66   Weight: 103.9 kg (229 lb)   Height: 6' 3" (1.905 m)     Physical Exam  Vitals and nursing note reviewed.   Constitutional:       General: He is not in acute distress.     Appearance: Normal appearance. He is well-developed and well-nourished. He is not toxic-appearing.   HENT:      Head: Normocephalic and atraumatic.      Right Ear: Tympanic membrane and external ear normal.      Left Ear: Tympanic membrane and external ear normal.      Nose: Nose normal.      Mouth/Throat:      Mouth: Oropharynx is clear and moist.      Pharynx: Oropharynx is clear.   Eyes:      Extraocular Movements: EOM normal.      Pupils: Pupils are equal, round, and reactive to light.   Neck:      Thyroid: No thyromegaly.      Vascular: No carotid bruit.   Cardiovascular:      Rate and Rhythm: Normal rate and regular rhythm.      Pulses: Intact distal pulses.      Heart sounds: Normal heart sounds. No murmur heard.      Pulmonary:      Effort: Pulmonary effort is normal.      Breath sounds: Normal breath sounds. No wheezing or rales.   Abdominal:      General: Bowel sounds are normal. There is no distension.      Palpations: Abdomen is soft. There is no hepatosplenomegaly.      Tenderness: There is no abdominal tenderness.   Musculoskeletal:         General: No tenderness or deformity. Normal range of motion.      Cervical back: Normal range of motion and neck supple.      Lumbar back: Normal. No pain or spasms.      Comments: Bends 90 degrees at  waist, shoulders and knees have full range of motion.  No peripheral edema.   Lymphadenopathy:      Cervical: " No cervical adenopathy.   Skin:     General: Skin is warm and dry.      Findings: No rash.   Neurological:      Mental Status: He is alert and oriented to person, place, and time. Mental status is at baseline.      Cranial Nerves: No cranial nerve deficit.      Coordination: Coordination normal.   Psychiatric:         Mood and Affect: Mood and affect and mood normal.         Behavior: Behavior normal.         Thought Content: Thought content normal.         Judgment: Judgment normal.           Assessment:       1. Wellness examination    2. Hypertension, unspecified type    3. Other schizophrenia    4. Amnestic disorder         Plan:       Hypertension, unspecified type  -     lisinopriL (PRINIVIL,ZESTRIL) 40 MG tablet; Take 1 tablet (40 mg total) by mouth once daily.  Dispense: 90 tablet; Refill: 1  -     CBC Auto Differential; Future; Expected date: 01/06/2022  -     Comprehensive Metabolic Panel; Future; Expected date: 01/06/2022  -     Lipid Panel; Future; Expected date: 01/06/2022  -     TSH w/reflex to FT4; Future; Expected date: 01/06/2022  Blood pressure well controlled.  Will order baseline labs to be done today.  Other schizophrenia  Zyprexa 10 mg daily working well.  Continue psychiatric follow-up with Dr. Azar.  Amnestic disorder  He declines flu vaccine and COVID vaccines    Follow up in about 6 months (around 7/6/2022), or Hypertension.        1/6/2022 Tristan Mae

## 2022-01-07 LAB
ALBUMIN SERPL-MCNC: 4.4 G/DL (ref 3.6–5.1)
ALBUMIN/GLOB SERPL: 1.7 (CALC) (ref 1–2.5)
ALP SERPL-CCNC: 47 U/L (ref 36–130)
ALT SERPL-CCNC: 22 U/L (ref 9–46)
AST SERPL-CCNC: 17 U/L (ref 10–40)
BASOPHILS # BLD AUTO: 37 CELLS/UL (ref 0–200)
BASOPHILS NFR BLD AUTO: 0.5 %
BILIRUB SERPL-MCNC: 0.6 MG/DL (ref 0.2–1.2)
BUN SERPL-MCNC: 18 MG/DL (ref 7–25)
BUN/CREAT SERPL: NORMAL (CALC) (ref 6–22)
CALCIUM SERPL-MCNC: 9.2 MG/DL (ref 8.6–10.3)
CHLORIDE SERPL-SCNC: 106 MMOL/L (ref 98–110)
CHOLEST SERPL-MCNC: 241 MG/DL
CHOLEST/HDLC SERPL: 4.7 (CALC)
CO2 SERPL-SCNC: 25 MMOL/L (ref 20–32)
CREAT SERPL-MCNC: 1.04 MG/DL (ref 0.6–1.35)
EOSINOPHIL # BLD AUTO: 30 CELLS/UL (ref 15–500)
EOSINOPHIL NFR BLD AUTO: 0.4 %
ERYTHROCYTE [DISTWIDTH] IN BLOOD BY AUTOMATED COUNT: 12.1 % (ref 11–15)
GLOBULIN SER CALC-MCNC: 2.6 G/DL (CALC) (ref 1.9–3.7)
GLUCOSE SERPL-MCNC: 90 MG/DL (ref 65–139)
HCT VFR BLD AUTO: 43.8 % (ref 38.5–50)
HDLC SERPL-MCNC: 51 MG/DL
HGB BLD-MCNC: 15 G/DL (ref 13.2–17.1)
LDLC SERPL CALC-MCNC: 169 MG/DL (CALC)
LYMPHOCYTES # BLD AUTO: 1354 CELLS/UL (ref 850–3900)
LYMPHOCYTES NFR BLD AUTO: 18.3 %
MCH RBC QN AUTO: 30.7 PG (ref 27–33)
MCHC RBC AUTO-ENTMCNC: 34.2 G/DL (ref 32–36)
MCV RBC AUTO: 89.6 FL (ref 80–100)
MONOCYTES # BLD AUTO: 392 CELLS/UL (ref 200–950)
MONOCYTES NFR BLD AUTO: 5.3 %
NEUTROPHILS # BLD AUTO: 5587 CELLS/UL (ref 1500–7800)
NEUTROPHILS NFR BLD AUTO: 75.5 %
NONHDLC SERPL-MCNC: 190 MG/DL (CALC)
PLATELET # BLD AUTO: 223 THOUSAND/UL (ref 140–400)
PMV BLD REES-ECKER: 9.6 FL (ref 7.5–12.5)
POTASSIUM SERPL-SCNC: 4.5 MMOL/L (ref 3.5–5.3)
PROT SERPL-MCNC: 7 G/DL (ref 6.1–8.1)
RBC # BLD AUTO: 4.89 MILLION/UL (ref 4.2–5.8)
SODIUM SERPL-SCNC: 139 MMOL/L (ref 135–146)
TRIGL SERPL-MCNC: 91 MG/DL
TSH SERPL-ACNC: 1.54 MIU/L (ref 0.4–4.5)
WBC # BLD AUTO: 7.4 THOUSAND/UL (ref 3.8–10.8)

## 2022-01-09 NOTE — PROGRESS NOTES
Call patient.  His lab work looks excellent except for an elevated cholesterol of 241. Triglycerides 91. CBC , thyroid and CMP were normal .  Reduce  fried foods and fast foods in your diet.  Recheck lipid panel in 6 months.

## 2022-01-10 ENCOUNTER — TELEPHONE (OUTPATIENT)
Dept: FAMILY MEDICINE | Facility: CLINIC | Age: 52
End: 2022-01-10
Payer: MEDICARE

## 2022-01-10 NOTE — TELEPHONE ENCOUNTER
Spoke to Homa, mother, with results verbatim per Dr Mae. Verbalized understanding on all. Remind me for lab to be done prior to July visit.

## 2022-01-10 NOTE — TELEPHONE ENCOUNTER
----- Message from Tristan Mae MD sent at 1/9/2022  4:08 PM CST -----  Call patient.  His lab work looks excellent except for an elevated cholesterol of 241. Triglycerides 91. CBC , thyroid and CMP were normal .  Reduce  fried foods and fast foods in your diet.  Recheck lipid panel in 6 months.

## 2022-06-22 ENCOUNTER — TELEPHONE (OUTPATIENT)
Dept: FAMILY MEDICINE | Facility: CLINIC | Age: 52
End: 2022-06-22

## 2022-06-22 DIAGNOSIS — E78.00 ELEVATED CHOLESTEROL: Primary | ICD-10-CM

## 2022-06-22 NOTE — TELEPHONE ENCOUNTER
Spoke to DAVID Stoll, that fasting lab is due to recheck cholesterol. She will let him know. Order to [facility]. Updated remind me.

## 2022-06-22 NOTE — TELEPHONE ENCOUNTER
----- Message from [facility], RT sent at 1/10/2022 12:14 PM CST -----  Regarding: Lab due  Tristan Mae MD   1/9/2022  4:08 PM CST Back to Top      Call patient.  His lab work looks excellent except for an elevated cholesterol of 241. Triglycerides 91. CBC , thyroid and CMP were normal .  Reduce  fried foods and fast foods in your diet.  Recheck lipid panel in 6 months.

## 2022-06-25 LAB
CHOLEST SERPL-MCNC: 214 MG/DL
CHOLEST/HDLC SERPL: 3.9 (CALC)
HDLC SERPL-MCNC: 55 MG/DL
LDLC SERPL CALC-MCNC: 142 MG/DL (CALC)
NONHDLC SERPL-MCNC: 159 MG/DL (CALC)
TRIGL SERPL-MCNC: 74 MG/DL

## 2022-07-07 ENCOUNTER — OFFICE VISIT (OUTPATIENT)
Dept: FAMILY MEDICINE | Facility: CLINIC | Age: 52
End: 2022-07-07
Payer: MEDICARE

## 2022-07-07 VITALS
WEIGHT: 227 LBS | SYSTOLIC BLOOD PRESSURE: 130 MMHG | BODY MASS INDEX: 28.23 KG/M2 | HEART RATE: 80 BPM | DIASTOLIC BLOOD PRESSURE: 80 MMHG | HEIGHT: 75 IN

## 2022-07-07 DIAGNOSIS — Z12.11 SPECIAL SCREENING FOR MALIGNANT NEOPLASMS, COLON: ICD-10-CM

## 2022-07-07 DIAGNOSIS — Z12.5 SPECIAL SCREENING FOR MALIGNANT NEOPLASM OF PROSTATE: ICD-10-CM

## 2022-07-07 DIAGNOSIS — I10 HYPERTENSION, UNSPECIFIED TYPE: Primary | ICD-10-CM

## 2022-07-07 DIAGNOSIS — E78.2 MIXED HYPERLIPIDEMIA: ICD-10-CM

## 2022-07-07 DIAGNOSIS — F20.89 OTHER SCHIZOPHRENIA: ICD-10-CM

## 2022-07-07 PROCEDURE — 3075F PR MOST RECENT SYSTOLIC BLOOD PRESS GE 130-139MM HG: ICD-10-PCS | Mod: CPTII,S$GLB,, | Performed by: FAMILY MEDICINE

## 2022-07-07 PROCEDURE — 3075F SYST BP GE 130 - 139MM HG: CPT | Mod: CPTII,S$GLB,, | Performed by: FAMILY MEDICINE

## 2022-07-07 PROCEDURE — 4010F PR ACE/ARB THEARPY RXD/TAKEN: ICD-10-PCS | Mod: CPTII,S$GLB,, | Performed by: FAMILY MEDICINE

## 2022-07-07 PROCEDURE — 4010F ACE/ARB THERAPY RXD/TAKEN: CPT | Mod: CPTII,S$GLB,, | Performed by: FAMILY MEDICINE

## 2022-07-07 PROCEDURE — 3008F BODY MASS INDEX DOCD: CPT | Mod: CPTII,S$GLB,, | Performed by: FAMILY MEDICINE

## 2022-07-07 PROCEDURE — 3079F DIAST BP 80-89 MM HG: CPT | Mod: CPTII,S$GLB,, | Performed by: FAMILY MEDICINE

## 2022-07-07 PROCEDURE — 99213 OFFICE O/P EST LOW 20 MIN: CPT | Mod: S$GLB,,, | Performed by: FAMILY MEDICINE

## 2022-07-07 PROCEDURE — 3008F PR BODY MASS INDEX (BMI) DOCUMENTED: ICD-10-PCS | Mod: CPTII,S$GLB,, | Performed by: FAMILY MEDICINE

## 2022-07-07 PROCEDURE — 3079F PR MOST RECENT DIASTOLIC BLOOD PRESSURE 80-89 MM HG: ICD-10-PCS | Mod: CPTII,S$GLB,, | Performed by: FAMILY MEDICINE

## 2022-07-07 PROCEDURE — 99213 PR OFFICE/OUTPT VISIT, EST, LEVL III, 20-29 MIN: ICD-10-PCS | Mod: S$GLB,,, | Performed by: FAMILY MEDICINE

## 2022-07-07 RX ORDER — ROSUVASTATIN CALCIUM 5 MG/1
TABLET, COATED ORAL
Qty: 36 TABLET | Refills: 1 | Status: SHIPPED | OUTPATIENT
Start: 2022-07-07 | End: 2022-09-23 | Stop reason: SDUPTHER

## 2022-07-07 RX ORDER — LISINOPRIL 40 MG/1
40 TABLET ORAL DAILY
Qty: 90 TABLET | Refills: 1 | Status: SHIPPED | OUTPATIENT
Start: 2022-07-07 | End: 2023-01-09 | Stop reason: SDUPTHER

## 2022-07-07 NOTE — PROGRESS NOTES
SUBJECTIVE:    Patient ID: Jeffrey Pinon is a 52 y.o. male.    Chief Complaint: Hypertension (No bottles // need refill // Colonoscopy ordered // Lab-results //abc )    This 52-year-old male has history of schizophrenia and currently sees psychiatrist Dr. Azar [place].  Stable Zyprexa daily.    Currently exercises 5 times a week, has a  3 times a week, cardio time 60 minutes 2 times a week.  He feels excellent has been losing weight well.  Eats a healthy diet with pre prepared Paleo type meals.  High-protein mostly.    Protein C deficiency, had a DVT many years ago when he weighed 260 lb and was very sedentary.  Today is maintained on aspirin 81 mg daily and has had no recent clots.    Hyperlipidemia, lipids show cholesterol 214 an LDL of 142. Mother recently had an MI.  father is a retired pediatric cardiologist.        Telephone on 06/22/2022   Component Date Value Ref Range Status    Cholesterol 06/24/2022 214 (A) <200 mg/dL Final    HDL 06/24/2022 55  > OR = 40 mg/dL Final    Triglycerides 06/24/2022 74  <150 mg/dL Final    LDL Cholesterol 06/24/2022 142 (A) mg/dL (calc) Final    HDL/Cholesterol Ratio 06/24/2022 3.9  <5.0 (calc) Final    Non HDL Chol. (LDL+VLDL) 06/24/2022 159 (A) <130 mg/dL (calc) Final       Past Medical History:   Diagnosis Date    Delusional disorder     Hypertension      Social History     Socioeconomic History    Marital status:    Tobacco Use    Smoking status: Never Smoker    Smokeless tobacco: Never Used   Substance and Sexual Activity    Alcohol use: No    Drug use: No     No past surgical history on file.  Family History   Problem Relation Age of Onset    Hypertension Father     Clotting disorder Father         Protein C deficiency       Review of patient's allergies indicates:   Allergen Reactions    Warfarin        Current Outpatient Medications:     lisinopriL (PRINIVIL,ZESTRIL) 40 MG tablet, Take 1 tablet (40 mg total) by  "mouth once daily., Disp: 90 tablet, Rfl: 1    loratadine (CLARITIN) 10 mg tablet, Take 10 mg by mouth once daily., Disp: , Rfl:     OLANZapine (ZYPREXA) 10 MG tablet, Take 10 mg by mouth every evening., Disp: , Rfl:     Review of Systems   Constitutional: Negative for appetite change, chills, fatigue, fever and unexpected weight change.   HENT: Negative for congestion, ear pain and trouble swallowing.    Eyes: Negative for pain, discharge and visual disturbance.   Respiratory: Negative for apnea, cough, shortness of breath and wheezing.    Cardiovascular: Negative for chest pain and leg swelling.   Gastrointestinal: Negative for abdominal pain, blood in stool, constipation, diarrhea, nausea and vomiting.   Endocrine: Negative for cold intolerance, heat intolerance and polydipsia.   Genitourinary: Negative for dysuria, hematuria, testicular pain and urgency.   Musculoskeletal: Negative for gait problem, joint swelling and myalgias.   Neurological: Negative for dizziness, seizures and numbness.   Psychiatric/Behavioral: Negative for agitation, behavioral problems and hallucinations. The patient is not nervous/anxious.           Objective:      Vitals:    07/07/22 1037   BP: 130/80   Pulse: 80   Weight: 103 kg (227 lb)   Height: 6' 3" (1.905 m)     Physical Exam  Vitals and nursing note reviewed.   Constitutional:       General: He is not in acute distress.     Appearance: Normal appearance. He is well-developed and normal weight. He is not toxic-appearing.   HENT:      Head: Normocephalic and atraumatic.      Right Ear: Tympanic membrane and external ear normal.      Left Ear: Tympanic membrane and external ear normal.      Nose: Nose normal.      Mouth/Throat:      Pharynx: Oropharynx is clear.   Eyes:      Pupils: Pupils are equal, round, and reactive to light.   Neck:      Thyroid: No thyromegaly.      Vascular: No carotid bruit.   Cardiovascular:      Rate and Rhythm: Normal rate and regular rhythm.      Heart " sounds: Normal heart sounds. No murmur heard.  Pulmonary:      Effort: Pulmonary effort is normal.      Breath sounds: Normal breath sounds. No wheezing or rales.   Abdominal:      General: Bowel sounds are normal. There is no distension.      Palpations: Abdomen is soft.      Tenderness: There is no abdominal tenderness.   Musculoskeletal:         General: No tenderness or deformity. Normal range of motion.      Cervical back: Normal range of motion and neck supple.      Lumbar back: Normal. No spasms.      Comments: Bends 90 degrees at  waist shoulders knees good range of motion without crepitance.  No pitting edema to lower extremities.   Lymphadenopathy:      Cervical: No cervical adenopathy.   Skin:     General: Skin is warm and dry.      Findings: No rash.   Neurological:      Mental Status: He is alert and oriented to person, place, and time.      Cranial Nerves: No cranial nerve deficit.      Coordination: Coordination normal.   Psychiatric:         Mood and Affect: Mood normal.         Behavior: Behavior normal.         Thought Content: Thought content normal.         Judgment: Judgment normal.           Assessment:       1. Hypertension, unspecified type    2. Other schizophrenia    3. Mixed hyperlipidemia         Plan:       Hypertension, unspecified type  Blood pressure well controlled with lisinopril.  Other schizophrenia  Stable on Zyprexa  Mixed hyperlipidemia  Cholesterol has improved from 247 down to 214 however LDL cholesterol still high at 142.  Add rosuvastatin 5 mg Monday Wednesday Friday.  Recheck lipids in 6 months    No follow-ups on file.        7/7/2022 Tristan Mae

## 2022-09-23 DIAGNOSIS — E78.2 MIXED HYPERLIPIDEMIA: ICD-10-CM

## 2022-09-23 RX ORDER — ROSUVASTATIN CALCIUM 5 MG/1
TABLET, COATED ORAL
Qty: 36 TABLET | Refills: 1 | Status: SHIPPED | OUTPATIENT
Start: 2022-09-23 | End: 2023-01-09 | Stop reason: SDUPTHER

## 2022-09-23 NOTE — TELEPHONE ENCOUNTER
----- Message from Sue Mcduffie sent at 9/23/2022 11:29 AM CDT -----   10:33 Refill Rosuvastatin [facility] on . pt's #  [phone]

## 2022-12-15 ENCOUNTER — TELEPHONE (OUTPATIENT)
Dept: FAMILY MEDICINE | Facility: CLINIC | Age: 52
End: 2022-12-15

## 2022-12-15 NOTE — TELEPHONE ENCOUNTER
----- Message from Deandre Kitchen MA sent at 12/15/2022  9:51 AM CST -----  Regarding: Refill  Pt needs rosuvastatin sent to [facility] [address] # [phone]

## 2022-12-15 NOTE — TELEPHONE ENCOUNTER
Pt is needing his rosuvastatin refill. Last office visit 07/07/2022. Next office visit 01/09/2023.     New prescription for his rosuvastatin was sent into the pharmacy on 09/23/2022 for a 90 day supply and with 1 refill.

## 2022-12-15 NOTE — TELEPHONE ENCOUNTER
Spoke with pt's mother Homa in regards to message sent. Verbalized to Homa that a new prescription for his rosuvastatin was sent into the pharmacy on 09/23/2022 for a 90 day supply and with 1 refill. Verbalized to Homa that all they need to do is call the pharmacy for a refill. Homa acknowledge understanding.

## 2022-12-21 ENCOUNTER — TELEPHONE (OUTPATIENT)
Dept: FAMILY MEDICINE | Facility: CLINIC | Age: 52
End: 2022-12-21

## 2022-12-21 NOTE — TELEPHONE ENCOUNTER
Called patient, that is his mom's number and she doesn't know his number. Said to call her , Yanick, did this and let him know to tell Mr Murphy is due for fasting lab work prior to visit.

## 2023-01-05 LAB
ALBUMIN SERPL-MCNC: 4.1 G/DL (ref 3.6–5.1)
ALBUMIN/GLOB SERPL: 1.6 (CALC) (ref 1–2.5)
ALP SERPL-CCNC: 40 U/L (ref 35–144)
ALT SERPL-CCNC: 40 U/L (ref 9–46)
AST SERPL-CCNC: 19 U/L (ref 10–35)
BASOPHILS # BLD AUTO: 29 CELLS/UL (ref 0–200)
BASOPHILS NFR BLD AUTO: 0.5 %
BILIRUB SERPL-MCNC: 0.6 MG/DL (ref 0.2–1.2)
BUN SERPL-MCNC: 17 MG/DL (ref 7–25)
BUN/CREAT SERPL: NORMAL (CALC) (ref 6–22)
CALCIUM SERPL-MCNC: 8.7 MG/DL (ref 8.6–10.3)
CHLORIDE SERPL-SCNC: 108 MMOL/L (ref 98–110)
CHOLEST SERPL-MCNC: 208 MG/DL
CHOLEST/HDLC SERPL: 4.1 (CALC)
CO2 SERPL-SCNC: 27 MMOL/L (ref 20–32)
CREAT SERPL-MCNC: 1.11 MG/DL (ref 0.7–1.3)
EGFR: 81 ML/MIN/1.73M2
EOSINOPHIL # BLD AUTO: 151 CELLS/UL (ref 15–500)
EOSINOPHIL NFR BLD AUTO: 2.6 %
ERYTHROCYTE [DISTWIDTH] IN BLOOD BY AUTOMATED COUNT: 12.4 % (ref 11–15)
GLOBULIN SER CALC-MCNC: 2.5 G/DL (CALC) (ref 1.9–3.7)
GLUCOSE SERPL-MCNC: 87 MG/DL (ref 65–99)
HCT VFR BLD AUTO: 45.1 % (ref 38.5–50)
HDLC SERPL-MCNC: 51 MG/DL
HGB BLD-MCNC: 15.1 G/DL (ref 13.2–17.1)
LDLC SERPL CALC-MCNC: 139 MG/DL (CALC)
LYMPHOCYTES # BLD AUTO: 1885 CELLS/UL (ref 850–3900)
LYMPHOCYTES NFR BLD AUTO: 32.5 %
MCH RBC QN AUTO: 30.4 PG (ref 27–33)
MCHC RBC AUTO-ENTMCNC: 33.5 G/DL (ref 32–36)
MCV RBC AUTO: 90.9 FL (ref 80–100)
MONOCYTES # BLD AUTO: 394 CELLS/UL (ref 200–950)
MONOCYTES NFR BLD AUTO: 6.8 %
NEUTROPHILS # BLD AUTO: 3341 CELLS/UL (ref 1500–7800)
NEUTROPHILS NFR BLD AUTO: 57.6 %
NONHDLC SERPL-MCNC: 157 MG/DL (CALC)
PLATELET # BLD AUTO: 188 THOUSAND/UL (ref 140–400)
PMV BLD REES-ECKER: 9.5 FL (ref 7.5–12.5)
POTASSIUM SERPL-SCNC: 4.6 MMOL/L (ref 3.5–5.3)
PROT SERPL-MCNC: 6.6 G/DL (ref 6.1–8.1)
PSA SERPL-MCNC: 0.93 NG/ML
RBC # BLD AUTO: 4.96 MILLION/UL (ref 4.2–5.8)
SODIUM SERPL-SCNC: 141 MMOL/L (ref 135–146)
TRIGL SERPL-MCNC: 84 MG/DL
TSH SERPL-ACNC: 2.31 MIU/L (ref 0.4–4.5)
WBC # BLD AUTO: 5.8 THOUSAND/UL (ref 3.8–10.8)

## 2023-01-09 ENCOUNTER — OFFICE VISIT (OUTPATIENT)
Dept: FAMILY MEDICINE | Facility: CLINIC | Age: 53
End: 2023-01-09
Payer: MEDICARE

## 2023-01-09 VITALS — HEART RATE: 64 BPM | SYSTOLIC BLOOD PRESSURE: 134 MMHG | OXYGEN SATURATION: 99 % | DIASTOLIC BLOOD PRESSURE: 76 MMHG

## 2023-01-09 DIAGNOSIS — F20.89 OTHER SCHIZOPHRENIA: ICD-10-CM

## 2023-01-09 DIAGNOSIS — Z00.00 WELLNESS EXAMINATION: Primary | ICD-10-CM

## 2023-01-09 DIAGNOSIS — Z12.11 SPECIAL SCREENING FOR MALIGNANT NEOPLASMS, COLON: ICD-10-CM

## 2023-01-09 DIAGNOSIS — E78.2 MIXED HYPERLIPIDEMIA: ICD-10-CM

## 2023-01-09 DIAGNOSIS — I10 HYPERTENSION, UNSPECIFIED TYPE: ICD-10-CM

## 2023-01-09 PROCEDURE — 1159F MED LIST DOCD IN RCRD: CPT | Mod: CPTII,S$GLB,, | Performed by: FAMILY MEDICINE

## 2023-01-09 PROCEDURE — 3075F PR MOST RECENT SYSTOLIC BLOOD PRESS GE 130-139MM HG: ICD-10-PCS | Mod: CPTII,S$GLB,, | Performed by: FAMILY MEDICINE

## 2023-01-09 PROCEDURE — 3078F DIAST BP <80 MM HG: CPT | Mod: CPTII,S$GLB,, | Performed by: FAMILY MEDICINE

## 2023-01-09 PROCEDURE — 3078F PR MOST RECENT DIASTOLIC BLOOD PRESSURE < 80 MM HG: ICD-10-PCS | Mod: CPTII,S$GLB,, | Performed by: FAMILY MEDICINE

## 2023-01-09 PROCEDURE — 99214 PR OFFICE/OUTPT VISIT, EST, LEVL IV, 30-39 MIN: ICD-10-PCS | Mod: S$GLB,,, | Performed by: FAMILY MEDICINE

## 2023-01-09 PROCEDURE — 3075F SYST BP GE 130 - 139MM HG: CPT | Mod: CPTII,S$GLB,, | Performed by: FAMILY MEDICINE

## 2023-01-09 PROCEDURE — 1159F PR MEDICATION LIST DOCUMENTED IN MEDICAL RECORD: ICD-10-PCS | Mod: CPTII,S$GLB,, | Performed by: FAMILY MEDICINE

## 2023-01-09 PROCEDURE — 99214 OFFICE O/P EST MOD 30 MIN: CPT | Mod: S$GLB,,, | Performed by: FAMILY MEDICINE

## 2023-01-09 RX ORDER — LISINOPRIL 40 MG/1
40 TABLET ORAL DAILY
Qty: 90 TABLET | Refills: 3 | Status: SHIPPED | OUTPATIENT
Start: 2023-01-09 | End: 2024-01-30 | Stop reason: SDUPTHER

## 2023-01-09 RX ORDER — ROSUVASTATIN CALCIUM 5 MG/1
TABLET, COATED ORAL
Qty: 90 TABLET | Refills: 3 | Status: SHIPPED | OUTPATIENT
Start: 2023-01-09 | End: 2023-07-10

## 2023-01-09 RX ORDER — OLANZAPINE 10 MG/1
10 TABLET ORAL NIGHTLY
Status: CANCELLED | OUTPATIENT
Start: 2023-01-09

## 2023-01-09 NOTE — PROGRESS NOTES
SUBJECTIVE:    Patient ID: Jeffrey Pinon is a 52 y.o. male.    Chief Complaint: Follow-up (No bottles// Medication refill// Pt has no complaints// [facility] ref set up 7/7/22//Declined Flu Vaccine//Ba)    HPI    No visits with results within 6 Month(s) from this visit.   Latest known visit with results is:   Office Visit on 07/07/2022   Component Date Value Ref Range Status    WBC 01/04/2023 5.8  3.8 - 10.8 Thousand/uL Final    RBC 01/04/2023 4.96  4.20 - 5.80 Million/uL Final    Hemoglobin 01/04/2023 15.1  13.2 - 17.1 g/dL Final    Hematocrit 01/04/2023 45.1  38.5 - 50.0 % Final    MCV 01/04/2023 90.9  80.0 - 100.0 fL Final    MCH 01/04/2023 30.4  27.0 - 33.0 pg Final    MCHC 01/04/2023 33.5  32.0 - 36.0 g/dL Final    RDW 01/04/2023 12.4  11.0 - 15.0 % Final    Platelets 01/04/2023 188  140 - 400 Thousand/uL Final    MPV 01/04/2023 9.5  7.5 - 12.5 fL Final    Neutrophils, Abs 01/04/2023 3,341  1,500 - 7,800 cells/uL Final    Lymph # 01/04/2023 1,885  850 - 3,900 cells/uL Final    Mono # 01/04/2023 394  200 - 950 cells/uL Final    Eos # 01/04/2023 151  15 - 500 cells/uL Final    Baso # 01/04/2023 29  0 - 200 cells/uL Final    Neutrophils Relative 01/04/2023 57.6  % Final    Lymph % 01/04/2023 32.5  % Final    Mono % 01/04/2023 6.8  % Final    Eosinophil % 01/04/2023 2.6  % Final    Basophil % 01/04/2023 0.5  % Final    Glucose 01/04/2023 87  65 - 99 mg/dL Final    BUN 01/04/2023 17  7 - 25 mg/dL Final    Creatinine 01/04/2023 1.11  0.70 - 1.30 mg/dL Final    eGFR 01/04/2023 81  > OR = 60 mL/min/1.73m2 Final    BUN/Creatinine Ratio 01/04/2023 NOT APPLICABLE  6 - 22 (calc) Final    Sodium 01/04/2023 141  135 - 146 mmol/L Final    Potassium 01/04/2023 4.6  3.5 - 5.3 mmol/L Final    Chloride 01/04/2023 108  98 - 110 mmol/L Final    CO2 01/04/2023 27  20 - 32 mmol/L Final    Calcium 01/04/2023 8.7  8.6 - 10.3 mg/dL Final    Total Protein 01/04/2023 6.6  6.1 - 8.1 g/dL Final    Albumin 01/04/2023 4.1  3.6 - 5.1 g/dL  Final    Globulin, Total 01/04/2023 2.5  1.9 - 3.7 g/dL (calc) Final    Albumin/Globulin Ratio 01/04/2023 1.6  1.0 - 2.5 (calc) Final    Total Bilirubin 01/04/2023 0.6  0.2 - 1.2 mg/dL Final    Alkaline Phosphatase 01/04/2023 40  35 - 144 U/L Final    AST 01/04/2023 19  10 - 35 U/L Final    ALT 01/04/2023 40  9 - 46 U/L Final    Cholesterol 01/04/2023 208 (H)  <200 mg/dL Final    HDL 01/04/2023 51  > OR = 40 mg/dL Final    Triglycerides 01/04/2023 84  <150 mg/dL Final    LDL Cholesterol 01/04/2023 139 (H)  mg/dL (calc) Final    HDL/Cholesterol Ratio 01/04/2023 4.1  <5.0 (calc) Final    Non HDL Chol. (LDL+VLDL) 01/04/2023 157 (H)  <130 mg/dL (calc) Final    PROSTATE SPECIFIC ANTIGEN, SCR - Q* 01/04/2023 0.93  < OR = 4.00 ng/mL Final    TSH w/reflex to FT4 01/04/2023 2.31  0.40 - 4.50 mIU/L Final       Past Medical History:   Diagnosis Date    Delusional disorder     Hypertension      Social History     Socioeconomic History    Marital status:    Tobacco Use    Smoking status: Never    Smokeless tobacco: Never   Substance and Sexual Activity    Alcohol use: No    Drug use: No     History reviewed. No pertinent surgical history.  Family History   Problem Relation Age of Onset    Hypertension Father     Clotting disorder Father         Protein C deficiency       Review of patient's allergies indicates:   Allergen Reactions    Warfarin        Current Outpatient Medications:     loratadine (CLARITIN) 10 mg tablet, Take 10 mg by mouth once daily., Disp: , Rfl:     OLANZapine (ZYPREXA) 10 MG tablet, Take 10 mg by mouth every evening., Disp: , Rfl:     lisinopriL (PRINIVIL,ZESTRIL) 40 MG tablet, Take 1 tablet (40 mg total) by mouth once daily., Disp: 90 tablet, Rfl: 3    rosuvastatin (CRESTOR) 5 MG tablet, Take 1 tablet  daily, Disp: 90 tablet, Rfl: 3    Review of Systems   Constitutional:  Negative for appetite change, chills, fatigue, fever and unexpected weight change.   HENT:  Negative for congestion, ear pain  "and trouble swallowing.    Eyes:  Negative for pain, discharge and visual disturbance.   Respiratory:  Negative for apnea, cough, shortness of breath and wheezing.    Cardiovascular:  Negative for chest pain and leg swelling.   Gastrointestinal:  Positive for constipation (Miralax  working well). Negative for abdominal pain, blood in stool, diarrhea, nausea and vomiting.   Endocrine: Negative for cold intolerance, heat intolerance and polydipsia.   Genitourinary:  Negative for dysuria, hematuria, testicular pain and urgency.   Musculoskeletal:  Negative for gait problem, joint swelling and myalgias.   Neurological:  Positive for headaches (rarely takes  ibuprofen). Negative for dizziness, seizures and numbness.   Psychiatric/Behavioral:  Negative for agitation, behavioral problems and hallucinations. The patient is not nervous/anxious.         Objective:      Vitals:    01/09/23 0904   BP: 134/76   Pulse: 64   SpO2: 99%   Weight: (P) 105.3 kg (232 lb 3.2 oz)   Height: (P) 6' 3.8" (1.925 m)     Physical Exam  Vitals and nursing note reviewed.   Constitutional:       General: He is not in acute distress.     Appearance: Normal appearance. He is well-developed. He is not toxic-appearing.   HENT:      Head: Normocephalic and atraumatic.      Right Ear: Tympanic membrane and external ear normal.      Left Ear: Tympanic membrane and external ear normal.      Nose: Nose normal.      Mouth/Throat:      Pharynx: Oropharynx is clear.   Eyes:      Pupils: Pupils are equal, round, and reactive to light.   Neck:      Thyroid: No thyromegaly.      Vascular: No carotid bruit.   Cardiovascular:      Rate and Rhythm: Normal rate and regular rhythm.      Heart sounds: Normal heart sounds. No murmur heard.  Pulmonary:      Effort: Pulmonary effort is normal.      Breath sounds: Normal breath sounds. No wheezing or rales.   Abdominal:      General: Bowel sounds are normal. There is no distension.      Palpations: Abdomen is soft.      " Tenderness: There is no abdominal tenderness.   Musculoskeletal:         General: No tenderness or deformity. Normal range of motion.      Cervical back: Normal range of motion and neck supple.      Lumbar back: Normal. No spasms.      Comments: Bends 90 degrees at  waist shoulders and knees good range of motion without crepitance no pitting edema to lower extremities   Lymphadenopathy:      Cervical: No cervical adenopathy.   Skin:     General: Skin is warm and dry.      Findings: No rash.   Neurological:      Mental Status: He is alert and oriented to person, place, and time. Mental status is at baseline.      Cranial Nerves: No cranial nerve deficit.      Coordination: Coordination normal.   Psychiatric:         Mood and Affect: Mood normal.         Behavior: Behavior normal.         Thought Content: Thought content normal.         Judgment: Judgment normal.         Assessment:       1. Wellness examination    2. Hypertension, unspecified type    3. Mixed hyperlipidemia    4. Special screening for malignant neoplasms, colon    5. Other schizophrenia           Plan:       Wellness examination  PSA normal at 0.93  Hypertension, unspecified type  -     lisinopriL (PRINIVIL,ZESTRIL) 40 MG tablet; Take 1 tablet (40 mg total) by mouth once daily.  Dispense: 90 tablet; Refill: 3  Blood pressure well controlled on lisinopril  Mixed hyperlipidemia  -     rosuvastatin (CRESTOR) 5 MG tablet; Take 1 tablet  daily  Dispense: 90 tablet; Refill: 3  Cholesterol 208 HDL 51  TG 84, will increase rosuvastatin to 5 mg 7 days a week.  Check labs in 6 months  Special screening for malignant neoplasms, colon  -     Ambulatory referral/consult to Gastroenterology; Future; Expected date: 01/16/2023  Refer to Dr. Garner for colonoscopy  Other schizophrenia  Continue psych visits with Dr. Azar    No follow-ups on file.        1/9/2023 Tristan Mae

## 2023-02-24 ENCOUNTER — TELEPHONE (OUTPATIENT)
Dept: FAMILY MEDICINE | Facility: CLINIC | Age: 53
End: 2023-02-24

## 2023-02-24 NOTE — TELEPHONE ENCOUNTER
On gap list for colonoscopy. Please call and see if he was able to get scheduled with gastro or if he needs help getting an appt - referral was placed at [facility]

## 2023-03-21 ENCOUNTER — TELEPHONE (OUTPATIENT)
Dept: FAMILY MEDICINE | Facility: CLINIC | Age: 53
End: 2023-03-21

## 2023-04-20 ENCOUNTER — TELEPHONE (OUTPATIENT)
Dept: FAMILY MEDICINE | Facility: CLINIC | Age: 53
End: 2023-04-20

## 2023-04-20 NOTE — TELEPHONE ENCOUNTER
----- Message from Thelma Joy MA sent at 3/21/2023  2:21 PM CDT -----  Make sure we get pathology for colonoscopy to fix repeat date.

## 2023-04-20 NOTE — TELEPHONE ENCOUNTER
Left mess for Dr Moctezuma's MA, [place], to see if we can get copy of pathology report. Asked her to fax to in-house number [email or web address] and if they don't have report to let me know. Updated remind me.

## 2023-06-28 ENCOUNTER — TELEPHONE (OUTPATIENT)
Dept: FAMILY MEDICINE | Facility: CLINIC | Age: 53
End: 2023-06-28

## 2023-06-30 LAB
ALBUMIN SERPL-MCNC: 4.1 G/DL (ref 3.6–5.1)
ALBUMIN/GLOB SERPL: 1.6 (CALC) (ref 1–2.5)
ALP SERPL-CCNC: 46 U/L (ref 35–144)
ALT SERPL-CCNC: 22 U/L (ref 9–46)
AST SERPL-CCNC: 15 U/L (ref 10–35)
BILIRUB SERPL-MCNC: 0.4 MG/DL (ref 0.2–1.2)
BUN SERPL-MCNC: 15 MG/DL (ref 7–25)
BUN/CREAT SERPL: ABNORMAL (CALC) (ref 6–22)
CALCIUM SERPL-MCNC: 8.5 MG/DL (ref 8.6–10.3)
CHLORIDE SERPL-SCNC: 107 MMOL/L (ref 98–110)
CHOLEST SERPL-MCNC: 167 MG/DL
CHOLEST/HDLC SERPL: 3.5 (CALC)
CO2 SERPL-SCNC: 25 MMOL/L (ref 20–32)
CREAT SERPL-MCNC: 1.02 MG/DL (ref 0.7–1.3)
EGFR: 89 ML/MIN/1.73M2
GLOBULIN SER CALC-MCNC: 2.5 G/DL (CALC) (ref 1.9–3.7)
GLUCOSE SERPL-MCNC: 86 MG/DL (ref 65–99)
HDLC SERPL-MCNC: 48 MG/DL
LDLC SERPL CALC-MCNC: 101 MG/DL (CALC)
NONHDLC SERPL-MCNC: 119 MG/DL (CALC)
POTASSIUM SERPL-SCNC: 4.5 MMOL/L (ref 3.5–5.3)
PROT SERPL-MCNC: 6.6 G/DL (ref 6.1–8.1)
SODIUM SERPL-SCNC: 140 MMOL/L (ref 135–146)
TRIGL SERPL-MCNC: 87 MG/DL

## 2023-07-10 ENCOUNTER — OFFICE VISIT (OUTPATIENT)
Dept: FAMILY MEDICINE | Facility: CLINIC | Age: 53
End: 2023-07-10
Payer: MEDICARE

## 2023-07-10 VITALS
BODY MASS INDEX: 30.03 KG/M2 | HEART RATE: 56 BPM | WEIGHT: 234 LBS | SYSTOLIC BLOOD PRESSURE: 112 MMHG | DIASTOLIC BLOOD PRESSURE: 74 MMHG | HEIGHT: 74 IN

## 2023-07-10 DIAGNOSIS — Z23 NEED FOR VARICELLA VACCINE: ICD-10-CM

## 2023-07-10 DIAGNOSIS — E78.2 MIXED HYPERLIPIDEMIA: Primary | ICD-10-CM

## 2023-07-10 DIAGNOSIS — Z12.5 SPECIAL SCREENING FOR MALIGNANT NEOPLASM OF PROSTATE: ICD-10-CM

## 2023-07-10 DIAGNOSIS — F20.89 OTHER SCHIZOPHRENIA: ICD-10-CM

## 2023-07-10 DIAGNOSIS — I10 PRIMARY HYPERTENSION: ICD-10-CM

## 2023-07-10 PROCEDURE — 3008F BODY MASS INDEX DOCD: CPT | Mod: CPTII,S$GLB,, | Performed by: FAMILY MEDICINE

## 2023-07-10 PROCEDURE — 4010F PR ACE/ARB THEARPY RXD/TAKEN: ICD-10-PCS | Mod: CPTII,S$GLB,, | Performed by: FAMILY MEDICINE

## 2023-07-10 PROCEDURE — 3078F DIAST BP <80 MM HG: CPT | Mod: CPTII,S$GLB,, | Performed by: FAMILY MEDICINE

## 2023-07-10 PROCEDURE — 3008F PR BODY MASS INDEX (BMI) DOCUMENTED: ICD-10-PCS | Mod: CPTII,S$GLB,, | Performed by: FAMILY MEDICINE

## 2023-07-10 PROCEDURE — 99213 OFFICE O/P EST LOW 20 MIN: CPT | Mod: S$GLB,,, | Performed by: FAMILY MEDICINE

## 2023-07-10 PROCEDURE — 1159F MED LIST DOCD IN RCRD: CPT | Mod: CPTII,S$GLB,, | Performed by: FAMILY MEDICINE

## 2023-07-10 PROCEDURE — 1159F PR MEDICATION LIST DOCUMENTED IN MEDICAL RECORD: ICD-10-PCS | Mod: CPTII,S$GLB,, | Performed by: FAMILY MEDICINE

## 2023-07-10 PROCEDURE — 3074F PR MOST RECENT SYSTOLIC BLOOD PRESSURE < 130 MM HG: ICD-10-PCS | Mod: CPTII,S$GLB,, | Performed by: FAMILY MEDICINE

## 2023-07-10 PROCEDURE — 4010F ACE/ARB THERAPY RXD/TAKEN: CPT | Mod: CPTII,S$GLB,, | Performed by: FAMILY MEDICINE

## 2023-07-10 PROCEDURE — 99213 PR OFFICE/OUTPT VISIT, EST, LEVL III, 20-29 MIN: ICD-10-PCS | Mod: S$GLB,,, | Performed by: FAMILY MEDICINE

## 2023-07-10 PROCEDURE — 3078F PR MOST RECENT DIASTOLIC BLOOD PRESSURE < 80 MM HG: ICD-10-PCS | Mod: CPTII,S$GLB,, | Performed by: FAMILY MEDICINE

## 2023-07-10 PROCEDURE — 3074F SYST BP LT 130 MM HG: CPT | Mod: CPTII,S$GLB,, | Performed by: FAMILY MEDICINE

## 2023-07-10 RX ORDER — ZOSTER VACCINE RECOMBINANT, ADJUVANTED 50 MCG/0.5
0.5 KIT INTRAMUSCULAR ONCE
Qty: 1 EACH | Refills: 0 | Status: SHIPPED | OUTPATIENT
Start: 2023-07-10 | End: 2023-07-10

## 2023-07-10 RX ORDER — ROSUVASTATIN CALCIUM 10 MG/1
10 TABLET, COATED ORAL DAILY
Qty: 90 TABLET | Refills: 3 | Status: SHIPPED | OUTPATIENT
Start: 2023-07-10 | End: 2024-01-30 | Stop reason: SDUPTHER

## 2023-07-10 NOTE — PROGRESS NOTES
SUBJECTIVE:    Patient ID: Jeffrey Pinon is a 53 y.o. male.    Chief Complaint: Follow-up (Went over meds verbally, no complaints, f/u 6 mo, review Lab-results, abc)    This 53-year-old male on disability is now playing tennis 3-4 days per week for exercise.  Goes to the gym 3 times a week also.    Psychiatrist has him on  Zyprexa 10 mg HS for schizophrenia.  Sleeping well at night, no behavior disorders reported.    Hyperlipidemia, was started on rosuvastatin 5 mg daily due to high LDL of 169.    Had colonoscopy 2023 with Dr. Owen 5 years    PSA was normal in January 2023.    Interested in the Shingrix vaccine.        No visits with results within 6 Month(s) from this visit.   Latest known visit with results is:   Office Visit on 01/09/2023   Component Date Value Ref Range Status    Cholesterol 06/29/2023 167  <200 mg/dL Final    HDL 06/29/2023 48  > OR = 40 mg/dL Final    Triglycerides 06/29/2023 87  <150 mg/dL Final    LDL Cholesterol 06/29/2023 101 (H)  mg/dL (calc) Final    HDL/Cholesterol Ratio 06/29/2023 3.5  <5.0 (calc) Final    Non HDL Chol. (LDL+VLDL) 06/29/2023 119  <130 mg/dL (calc) Final    Glucose 06/29/2023 86  65 - 99 mg/dL Final    BUN 06/29/2023 15  7 - 25 mg/dL Final    Creatinine 06/29/2023 1.02  0.70 - 1.30 mg/dL Final    eGFR 06/29/2023 89  > OR = 60 mL/min/1.73m2 Final    BUN/Creatinine Ratio 06/29/2023 NOT APPLICABLE  6 - 22 (calc) Final    Sodium 06/29/2023 140  135 - 146 mmol/L Final    Potassium 06/29/2023 4.5  3.5 - 5.3 mmol/L Final    Chloride 06/29/2023 107  98 - 110 mmol/L Final    CO2 06/29/2023 25  20 - 32 mmol/L Final    Calcium 06/29/2023 8.5 (L)  8.6 - 10.3 mg/dL Final    Total Protein 06/29/2023 6.6  6.1 - 8.1 g/dL Final    Albumin 06/29/2023 4.1  3.6 - 5.1 g/dL Final    Globulin, Total 06/29/2023 2.5  1.9 - 3.7 g/dL (calc) Final    Albumin/Globulin Ratio 06/29/2023 1.6  1.0 - 2.5 (calc) Final    Total Bilirubin 06/29/2023 0.4  0.2 - 1.2 mg/dL Final    Alkaline  Phosphatase 06/29/2023 46  35 - 144 U/L Final    AST 06/29/2023 15  10 - 35 U/L Final    ALT 06/29/2023 22  9 - 46 U/L Final       Past Medical History:   Diagnosis Date    Delusional disorder     Hypertension     Personal history of colonic polyps      Social History     Socioeconomic History    Marital status:    Tobacco Use    Smoking status: Never    Smokeless tobacco: Never   Substance and Sexual Activity    Alcohol use: No    Drug use: No     Past Surgical History:   Procedure Laterality Date    COLONOSCOPY W/ POLYPECTOMY  03/21/2023    Dr. Moctezuma     Family History   Problem Relation Age of Onset    Hypertension Father     Clotting disorder Father         Protein C deficiency       The 10-year CVD risk score (Padmini, et al., 2008) is: 7.8%    Values used to calculate the score:      Age: 53 years      Sex: Male      Diabetic: No      Tobacco smoker: No      Systolic Blood Pressure: 112 mmHg      Is BP treated: Yes      HDL Cholesterol: 48 mg/dL      Total Cholesterol: 167 mg/dL    Review of patient's allergies indicates:   Allergen Reactions    Warfarin        Current Outpatient Medications:     lisinopriL (PRINIVIL,ZESTRIL) 40 MG tablet, Take 1 tablet (40 mg total) by mouth once daily., Disp: 90 tablet, Rfl: 3    loratadine (CLARITIN) 10 mg tablet, Take 10 mg by mouth once daily., Disp: , Rfl:     OLANZapine (ZYPREXA) 10 MG tablet, Take 10 mg by mouth every evening., Disp: , Rfl:     rosuvastatin (CRESTOR) 10 MG tablet, Take 1 tablet (10 mg total) by mouth once daily., Disp: 90 tablet, Rfl: 3    varicella-zoster gE-AS01B, PF, (SHINGRIX, PF,) 50 mcg/0.5 mL injection, Inject 0.5 mLs into the muscle once. for 1 dose, Disp: 1 each, Rfl: 0    Review of Systems   Constitutional:  Negative for appetite change, chills, fatigue, fever and unexpected weight change.   HENT:  Negative for ear pain and trouble swallowing.    Eyes:  Negative for pain, discharge and visual disturbance.   Respiratory:  Negative  "for apnea, cough, shortness of breath and wheezing.    Cardiovascular:  Negative for chest pain and leg swelling.   Gastrointestinal:  Negative for abdominal pain, blood in stool, constipation, diarrhea, nausea, vomiting and reflux.   Endocrine: Negative for cold intolerance, heat intolerance and polydipsia.   Genitourinary:  Negative for bladder incontinence, dysuria, erectile dysfunction, frequency, hematuria, testicular pain and urgency.   Musculoskeletal:  Negative for gait problem, joint swelling and myalgias.   Neurological:  Negative for dizziness, seizures and numbness.   Psychiatric/Behavioral:  Negative for agitation, behavioral problems and hallucinations. The patient is not nervous/anxious.          Objective:      Vitals:    07/10/23 0915   BP: 112/74   Pulse: (!) 56   Weight: 106.1 kg (234 lb)   Height: 6' 2" (1.88 m)     Physical Exam  Vitals and nursing note reviewed.   Constitutional:       General: He is not in acute distress.     Appearance: He is well-developed. He is obese. He is not toxic-appearing.   HENT:      Head: Normocephalic and atraumatic.      Right Ear: Tympanic membrane and external ear normal.      Left Ear: Tympanic membrane and external ear normal.      Nose: Nose normal.      Mouth/Throat:      Pharynx: Oropharynx is clear.   Eyes:      Pupils: Pupils are equal, round, and reactive to light.   Neck:      Thyroid: No thyromegaly.      Vascular: No carotid bruit.   Cardiovascular:      Rate and Rhythm: Normal rate and regular rhythm.      Heart sounds: Normal heart sounds. No murmur heard.  Pulmonary:      Effort: Pulmonary effort is normal.      Breath sounds: Normal breath sounds. No wheezing or rales.   Abdominal:      General: Bowel sounds are normal. There is no distension.      Palpations: Abdomen is soft.      Tenderness: There is no abdominal tenderness.   Musculoskeletal:         General: No tenderness or deformity. Normal range of motion.      Cervical back: Normal range " of motion and neck supple.      Lumbar back: Normal. No spasms.      Comments: Bends 90 degrees at  waist, shoulders and knees good range of motion, no increased tone,   Lymphadenopathy:      Cervical: No cervical adenopathy.   Skin:     General: Skin is warm and dry.      Findings: No rash.   Neurological:      Mental Status: He is alert and oriented to person, place, and time. Mental status is at baseline.      Cranial Nerves: No cranial nerve deficit.      Coordination: Coordination normal.      Gait: Gait normal.   Psychiatric:         Mood and Affect: Mood normal.         Behavior: Behavior normal.         Thought Content: Thought content normal.         Judgment: Judgment normal.         Assessment:       1. Mixed hyperlipidemia    2. Need for varicella vaccine    3. Other schizophrenia    4. Primary hypertension         Plan:       Mixed hyperlipidemia  -     rosuvastatin (CRESTOR) 10 MG tablet; Take 1 tablet (10 mg total) by mouth once daily.  Dispense: 90 tablet; Refill: 3  Cholesterol 167 HDL 48  TG 87, will shoot for a LDL around 70 due to family history of coronary artery disease.  Increased rosuvastatin to 10 mg daily.  Need for varicella vaccine  -     varicella-zoster gE-AS01B, PF, (SHINGRIX, PF,) 50 mcg/0.5 mL injection; Inject 0.5 mLs into the muscle once. for 1 dose  Dispense: 1 each; Refill: 0  Shingrix vaccine recommended  Other schizophrenia  Stable psychologically at this time  Primary hypertension  Blood pressure normal today.    Follow up in about 6 months (around 1/10/2024), or hld.        7/10/2023 Tristan Mae

## 2023-11-08 ENCOUNTER — TELEPHONE (OUTPATIENT)
Dept: FAMILY MEDICINE | Facility: CLINIC | Age: 53
End: 2023-11-08

## 2023-11-08 NOTE — TELEPHONE ENCOUNTER
----- Message from Angelina Barney sent at 11/8/2023 10:39 AM CST -----  Vm- Pt mom zohra is calling and his latest blood work needs to be sent to his psych dr. Lacy in [place] at [phone]

## 2023-12-22 ENCOUNTER — TELEPHONE (OUTPATIENT)
Dept: FAMILY MEDICINE | Facility: CLINIC | Age: 53
End: 2023-12-22
Payer: MEDICARE

## 2023-12-22 NOTE — TELEPHONE ENCOUNTER
Spoke to DAVID Stoll, that fasting lab and urine is due a week prior to visit, orders at [facility], encouraged water. Advised he can take morning meds that do not need to be taken with food. Also, asked that he come after January 5th due to PSA. Dodie verbalized understanding.

## 2024-01-06 LAB
ALBUMIN SERPL-MCNC: 4.3 G/DL (ref 3.6–5.1)
ALBUMIN/GLOB SERPL: 1.7 (CALC) (ref 1–2.5)
ALP SERPL-CCNC: 48 U/L (ref 35–144)
ALT SERPL-CCNC: 35 U/L (ref 9–46)
APPEARANCE UR: CLEAR
AST SERPL-CCNC: 19 U/L (ref 10–35)
BACTERIA #/AREA URNS HPF: NORMAL /HPF
BACTERIA UR CULT: NORMAL
BASOPHILS # BLD AUTO: 28 CELLS/UL (ref 0–200)
BASOPHILS NFR BLD AUTO: 0.5 %
BILIRUB SERPL-MCNC: 0.6 MG/DL (ref 0.2–1.2)
BILIRUB UR QL STRIP: NEGATIVE
BUN SERPL-MCNC: 17 MG/DL (ref 7–25)
BUN/CREAT SERPL: NORMAL (CALC) (ref 6–22)
CALCIUM SERPL-MCNC: 8.7 MG/DL (ref 8.6–10.3)
CHLORIDE SERPL-SCNC: 106 MMOL/L (ref 98–110)
CHOLEST SERPL-MCNC: 184 MG/DL
CHOLEST/HDLC SERPL: 3.7 (CALC)
CO2 SERPL-SCNC: 26 MMOL/L (ref 20–32)
COLOR UR: YELLOW
CREAT SERPL-MCNC: 1.01 MG/DL (ref 0.7–1.3)
EGFR: 90 ML/MIN/1.73M2
EOSINOPHIL # BLD AUTO: 112 CELLS/UL (ref 15–500)
EOSINOPHIL NFR BLD AUTO: 2 %
ERYTHROCYTE [DISTWIDTH] IN BLOOD BY AUTOMATED COUNT: 13 % (ref 11–15)
GLOBULIN SER CALC-MCNC: 2.5 G/DL (CALC) (ref 1.9–3.7)
GLUCOSE SERPL-MCNC: 91 MG/DL (ref 65–99)
GLUCOSE UR QL STRIP: NEGATIVE
HCT VFR BLD AUTO: 45.6 % (ref 38.5–50)
HDLC SERPL-MCNC: 50 MG/DL
HGB BLD-MCNC: 15.2 G/DL (ref 13.2–17.1)
HGB UR QL STRIP: NEGATIVE
HYALINE CASTS #/AREA URNS LPF: NORMAL /LPF
KETONES UR QL STRIP: NEGATIVE
LDLC SERPL CALC-MCNC: 118 MG/DL (CALC)
LEUKOCYTE ESTERASE UR QL STRIP: NEGATIVE
LYMPHOCYTES # BLD AUTO: 1602 CELLS/UL (ref 850–3900)
LYMPHOCYTES NFR BLD AUTO: 28.6 %
MCH RBC QN AUTO: 30.9 PG (ref 27–33)
MCHC RBC AUTO-ENTMCNC: 33.3 G/DL (ref 32–36)
MCV RBC AUTO: 92.7 FL (ref 80–100)
MONOCYTES # BLD AUTO: 297 CELLS/UL (ref 200–950)
MONOCYTES NFR BLD AUTO: 5.3 %
NEUTROPHILS # BLD AUTO: 3562 CELLS/UL (ref 1500–7800)
NEUTROPHILS NFR BLD AUTO: 63.6 %
NITRITE UR QL STRIP: NEGATIVE
NONHDLC SERPL-MCNC: 134 MG/DL (CALC)
PH UR STRIP: 6.5 [PH] (ref 5–8)
PLATELET # BLD AUTO: 204 THOUSAND/UL (ref 140–400)
PMV BLD REES-ECKER: 9.4 FL (ref 7.5–12.5)
POTASSIUM SERPL-SCNC: 4.5 MMOL/L (ref 3.5–5.3)
PROT SERPL-MCNC: 6.8 G/DL (ref 6.1–8.1)
PROT UR QL STRIP: NEGATIVE
PSA SERPL-MCNC: 0.88 NG/ML
RBC # BLD AUTO: 4.92 MILLION/UL (ref 4.2–5.8)
RBC #/AREA URNS HPF: NORMAL /HPF
SERVICE CMNT-IMP: NORMAL
SODIUM SERPL-SCNC: 138 MMOL/L (ref 135–146)
SP GR UR STRIP: 1.02 (ref 1–1.03)
SQUAMOUS #/AREA URNS HPF: NORMAL /HPF
TRIGL SERPL-MCNC: 69 MG/DL
TSH SERPL-ACNC: 1.49 MIU/L (ref 0.4–4.5)
WBC # BLD AUTO: 5.6 THOUSAND/UL (ref 3.8–10.8)
WBC #/AREA URNS HPF: NORMAL /HPF

## 2024-01-30 ENCOUNTER — OFFICE VISIT (OUTPATIENT)
Dept: FAMILY MEDICINE | Facility: CLINIC | Age: 54
End: 2024-01-30
Payer: MEDICARE

## 2024-01-30 VITALS
BODY MASS INDEX: 29.9 KG/M2 | SYSTOLIC BLOOD PRESSURE: 110 MMHG | HEIGHT: 74 IN | WEIGHT: 233 LBS | HEART RATE: 90 BPM | DIASTOLIC BLOOD PRESSURE: 68 MMHG

## 2024-01-30 DIAGNOSIS — F20.89 OTHER SCHIZOPHRENIA: ICD-10-CM

## 2024-01-30 DIAGNOSIS — E78.2 MIXED HYPERLIPIDEMIA: ICD-10-CM

## 2024-01-30 DIAGNOSIS — I10 HYPERTENSION, UNSPECIFIED TYPE: ICD-10-CM

## 2024-01-30 DIAGNOSIS — Z00.00 WELLNESS EXAMINATION: Primary | ICD-10-CM

## 2024-01-30 PROCEDURE — 3074F SYST BP LT 130 MM HG: CPT | Mod: CPTII,S$GLB,, | Performed by: FAMILY MEDICINE

## 2024-01-30 PROCEDURE — 3078F DIAST BP <80 MM HG: CPT | Mod: CPTII,S$GLB,, | Performed by: FAMILY MEDICINE

## 2024-01-30 PROCEDURE — 3008F BODY MASS INDEX DOCD: CPT | Mod: CPTII,S$GLB,, | Performed by: FAMILY MEDICINE

## 2024-01-30 PROCEDURE — 99396 PREV VISIT EST AGE 40-64: CPT | Mod: GZ,S$GLB,, | Performed by: FAMILY MEDICINE

## 2024-01-30 PROCEDURE — 1159F MED LIST DOCD IN RCRD: CPT | Mod: CPTII,S$GLB,, | Performed by: FAMILY MEDICINE

## 2024-01-30 RX ORDER — LISINOPRIL 40 MG/1
40 TABLET ORAL DAILY
Qty: 90 TABLET | Refills: 3 | Status: SHIPPED | OUTPATIENT
Start: 2024-01-30 | End: 2024-05-03 | Stop reason: SDUPTHER

## 2024-01-30 RX ORDER — ROSUVASTATIN CALCIUM 10 MG/1
10 TABLET, COATED ORAL DAILY
Qty: 90 TABLET | Refills: 3 | Status: SHIPPED | OUTPATIENT
Start: 2024-01-30 | End: 2024-05-03 | Stop reason: SDUPTHER

## 2024-01-30 NOTE — PROGRESS NOTES
SUBJECTIVE:    Patient ID: Jeffrey Pinon is a 53 y.o. male.    Chief Complaint: Hypertension (Went over meds verbally, declined flu vaccine, no complaints, need refills, review Lab-results, abc)    53-year-old male who stays active by playing tennis 3 times a week.  He lifting 3 times a week also.  No injuries reported, he has a  at the gym to help him with weightlifting and stretching.    March 2023-colonoscopy with Dr. Garner-RTC 5 years    Not much fast food; he has meal prep sent to his house in the evening for meals,    Psychiatrist -schizophrenia-Zyprexa 10 mg working well ,some mild constipation that he controls with MiraLax.    He declines flu vaccine, he is up-to-date on Shingrix vaccine however    Hyperlipidemia-rosuvastatin 10 mg nightly    Hypertension  Pertinent negatives include no chest pain or shortness of breath.       No visits with results within 6 Month(s) from this visit.   Latest known visit with results is:   Office Visit on 07/10/2023   Component Date Value Ref Range Status    WBC 01/05/2024 5.6  3.8 - 10.8 Thousand/uL Final    RBC 01/05/2024 4.92  4.20 - 5.80 Million/uL Final    Hemoglobin 01/05/2024 15.2  13.2 - 17.1 g/dL Final    Hematocrit 01/05/2024 45.6  38.5 - 50.0 % Final    MCV 01/05/2024 92.7  80.0 - 100.0 fL Final    MCH 01/05/2024 30.9  27.0 - 33.0 pg Final    MCHC 01/05/2024 33.3  32.0 - 36.0 g/dL Final    RDW 01/05/2024 13.0  11.0 - 15.0 % Final    Platelets 01/05/2024 204  140 - 400 Thousand/uL Final    MPV 01/05/2024 9.4  7.5 - 12.5 fL Final    Neutrophils, Abs 01/05/2024 3,562  1,500 - 7,800 cells/uL Final    Lymph # 01/05/2024 1,602  850 - 3,900 cells/uL Final    Mono # 01/05/2024 297  200 - 950 cells/uL Final    Eos # 01/05/2024 112  15 - 500 cells/uL Final    Baso # 01/05/2024 28  0 - 200 cells/uL Final    Neutrophils Relative 01/05/2024 63.6  % Final    Lymph % 01/05/2024 28.6  % Final    Mono % 01/05/2024 5.3  % Final    Eosinophil % 01/05/2024  2.0  % Final    Basophil % 01/05/2024 0.5  % Final    Glucose 01/05/2024 91  65 - 99 mg/dL Final    BUN 01/05/2024 17  7 - 25 mg/dL Final    Creatinine 01/05/2024 1.01  0.70 - 1.30 mg/dL Final    eGFR 01/05/2024 90  > OR = 60 mL/min/1.73m2 Final    BUN/Creatinine Ratio 01/05/2024 SEE NOTE:  6 - 22 (calc) Final    Sodium 01/05/2024 138  135 - 146 mmol/L Final    Potassium 01/05/2024 4.5  3.5 - 5.3 mmol/L Final    Chloride 01/05/2024 106  98 - 110 mmol/L Final    CO2 01/05/2024 26  20 - 32 mmol/L Final    Calcium 01/05/2024 8.7  8.6 - 10.3 mg/dL Final    Total Protein 01/05/2024 6.8  6.1 - 8.1 g/dL Final    Albumin 01/05/2024 4.3  3.6 - 5.1 g/dL Final    Globulin, Total 01/05/2024 2.5  1.9 - 3.7 g/dL (calc) Final    Albumin/Globulin Ratio 01/05/2024 1.7  1.0 - 2.5 (calc) Final    Total Bilirubin 01/05/2024 0.6  0.2 - 1.2 mg/dL Final    Alkaline Phosphatase 01/05/2024 48  35 - 144 U/L Final    AST 01/05/2024 19  10 - 35 U/L Final    ALT 01/05/2024 35  9 - 46 U/L Final    Cholesterol 01/05/2024 184  <200 mg/dL Final    HDL 01/05/2024 50  > OR = 40 mg/dL Final    Triglycerides 01/05/2024 69  <150 mg/dL Final    LDL Cholesterol 01/05/2024 118 (H)  mg/dL (calc) Final    HDL/Cholesterol Ratio 01/05/2024 3.7  <5.0 (calc) Final    Non HDL Chol. (LDL+VLDL) 01/05/2024 134 (H)  <130 mg/dL (calc) Final    PROSTATE SPECIFIC ANTIGEN, SCR - Q* 01/05/2024 0.88  < OR = 4.00 ng/mL Final    Color, UA 01/05/2024 YELLOW  YELLOW Final    Appearance, UA 01/05/2024 CLEAR  CLEAR Final    Specific Gravity, UA 01/05/2024 1.020  1.001 - 1.035 Final    pH, UA 01/05/2024 6.5  5.0 - 8.0 Final    Glucose, UA 01/05/2024 NEGATIVE  NEGATIVE Final    Bilirubin, UA 01/05/2024 NEGATIVE  NEGATIVE Final    Ketones, UA 01/05/2024 NEGATIVE  NEGATIVE Final    Occult Blood UA 01/05/2024 NEGATIVE  NEGATIVE Final    Protein, UA 01/05/2024 NEGATIVE  NEGATIVE Final    Nitrite, UA 01/05/2024 NEGATIVE  NEGATIVE Final    Leukocytes, UA 01/05/2024 NEGATIVE  NEGATIVE  Final    WBC Casts, UA 01/05/2024 NONE SEEN  < OR = 5 /HPF Final    RBC Casts, UA 01/05/2024 NONE SEEN  < OR = 2 /HPF Final    Squam Epithel, UA 01/05/2024 NONE SEEN  < OR = 5 /HPF Final    Bacteria, UA 01/05/2024 NONE SEEN  NONE SEEN /HPF Final    Hyaline Casts, UA 01/05/2024 NONE SEEN  NONE SEEN /LPF Final    Service Cmt: 01/05/2024    Final    Reflexive Urine Culture 01/05/2024    Final    TSH w/reflex to FT4 01/05/2024 1.49  0.40 - 4.50 mIU/L Final       Past Medical History:   Diagnosis Date    Delusional disorder     Hypertension     Personal history of colonic polyps      Social History     Socioeconomic History    Marital status:    Tobacco Use    Smoking status: Never    Smokeless tobacco: Never   Substance and Sexual Activity    Alcohol use: No    Drug use: No     Past Surgical History:   Procedure Laterality Date    COLONOSCOPY W/ POLYPECTOMY  03/21/2023    Dr. Merceror-RTC 5 years     Family History   Problem Relation Age of Onset    Hypertension Father     Clotting disorder Father         Protein C deficiency       The 10-year CVD risk score (Padmini, et al., 2008) is: 8%    Values used to calculate the score:      Age: 53 years      Sex: Male      Diabetic: No      Tobacco smoker: No      Systolic Blood Pressure: 110 mmHg      Is BP treated: Yes      HDL Cholesterol: 50 mg/dL      Total Cholesterol: 184 mg/dL    All of your core healthy metrics are met.      Review of patient's allergies indicates:   Allergen Reactions    Warfarin        Current Outpatient Medications:     lisinopriL (PRINIVIL,ZESTRIL) 40 MG tablet, Take 1 tablet (40 mg total) by mouth once daily., Disp: 90 tablet, Rfl: 3    loratadine (CLARITIN) 10 mg tablet, Take 10 mg by mouth once daily., Disp: , Rfl:     OLANZapine (ZYPREXA) 10 MG tablet, Take 10 mg by mouth every evening., Disp: , Rfl:     rosuvastatin (CRESTOR) 10 MG tablet, Take 1 tablet (10 mg total) by mouth once daily., Disp: 90 tablet, Rfl: 3    Review of Systems  "  Constitutional:  Negative for appetite change, chills, fatigue, fever and unexpected weight change.   HENT:  Negative for ear pain and trouble swallowing.    Eyes:  Negative for pain, discharge and visual disturbance.   Respiratory:  Negative for apnea, cough, shortness of breath and wheezing.    Cardiovascular:  Negative for chest pain and leg swelling.   Gastrointestinal:  Positive for constipation. Negative for abdominal pain, blood in stool, diarrhea, nausea, vomiting and reflux.   Endocrine: Negative for cold intolerance, heat intolerance and polydipsia.   Genitourinary:  Negative for bladder incontinence, dysuria, erectile dysfunction, frequency, hematuria, testicular pain and urgency.   Musculoskeletal:  Negative for gait problem, joint swelling and myalgias.   Neurological:  Negative for dizziness, seizures and numbness.   Psychiatric/Behavioral:  Negative for agitation, behavioral problems and hallucinations. The patient is not nervous/anxious.            Objective:      Vitals:    01/30/24 0752   BP: 110/68   Pulse: 90   Weight: 105.7 kg (233 lb)   Height: 6' 2" (1.88 m)     Physical Exam  Vitals and nursing note reviewed.   Constitutional:       General: He is not in acute distress.     Appearance: Normal appearance. He is well-developed. He is not toxic-appearing.   HENT:      Head: Normocephalic and atraumatic.      Right Ear: Tympanic membrane and external ear normal.      Left Ear: Tympanic membrane and external ear normal.      Nose: Nose normal.      Mouth/Throat:      Pharynx: Oropharynx is clear.   Eyes:      Pupils: Pupils are equal, round, and reactive to light.   Neck:      Thyroid: No thyromegaly.      Vascular: No carotid bruit.   Cardiovascular:      Rate and Rhythm: Normal rate and regular rhythm.      Heart sounds: Normal heart sounds. No murmur heard.  Pulmonary:      Effort: Pulmonary effort is normal.      Breath sounds: Normal breath sounds. No wheezing or rales.   Abdominal:      " General: Bowel sounds are normal. There is no distension.      Palpations: Abdomen is soft.      Tenderness: There is no abdominal tenderness.   Musculoskeletal:         General: No tenderness or deformity. Normal range of motion.      Cervical back: Normal range of motion and neck supple.      Lumbar back: Normal. No spasms.      Comments: Bends 90 degrees at  waist, shoulders good range of motion knees no crepitance.  No pitting edema to lower extremities   Lymphadenopathy:      Cervical: No cervical adenopathy.   Skin:     General: Skin is warm and dry.      Findings: No rash.   Neurological:      General: No focal deficit present.      Mental Status: He is alert and oriented to person, place, and time. Mental status is at baseline.      Cranial Nerves: No cranial nerve deficit.      Coordination: Coordination normal.   Psychiatric:         Mood and Affect: Mood normal.         Behavior: Behavior normal.         Thought Content: Thought content normal.         Judgment: Judgment normal.           Assessment:       1. Wellness examination    2. Hypertension, unspecified type    3. Mixed hyperlipidemia    4. Other schizophrenia         Plan:       Wellness examination  Patient has healthy exercise habits, all lab work results  reviewed with patient, PSA 0.88  Hypertension, unspecified type  -     lisinopriL (PRINIVIL,ZESTRIL) 40 MG tablet; Take 1 tablet (40 mg total) by mouth once daily.  Dispense: 90 tablet; Refill: 3  Blood pressure well controlled with lisinopril  Mixed hyperlipidemia  -     rosuvastatin (CRESTOR) 10 MG tablet; Take 1 tablet (10 mg total) by mouth once daily.  Dispense: 90 tablet; Refill: 3  Cholesterol 184 HDL 50 TG 69  minimal elevation, continue current medications and recheck lipids again in 6 months  Other schizophrenia  Stable on Zyprexa 10 mg daily    Follow up in about 6 months (around 7/30/2024), or hyperlipidemia.        1/30/2024 Tristan Mae

## 2024-05-03 DIAGNOSIS — E78.2 MIXED HYPERLIPIDEMIA: ICD-10-CM

## 2024-05-03 DIAGNOSIS — I10 HYPERTENSION, UNSPECIFIED TYPE: ICD-10-CM

## 2024-05-03 RX ORDER — ROSUVASTATIN CALCIUM 10 MG/1
10 TABLET, COATED ORAL DAILY
Qty: 90 TABLET | Refills: 3 | Status: SHIPPED | OUTPATIENT
Start: 2024-05-03 | End: 2025-05-03

## 2024-05-03 RX ORDER — LISINOPRIL 40 MG/1
40 TABLET ORAL DAILY
Qty: 90 TABLET | Refills: 3 | Status: SHIPPED | OUTPATIENT
Start: 2024-05-03 | End: 2024-05-06 | Stop reason: SDUPTHER

## 2024-05-03 NOTE — TELEPHONE ENCOUNTER
Dodie would like prescriptions to go to [facility]. Prescriptions set up for provider review.

## 2024-05-03 NOTE — TELEPHONE ENCOUNTER
----- Message from Bethanie Salvador sent at 5/3/2024 10:44 AM CDT -----  Vm:[phone]    They would like to get his medications mailed using [facility]. [facility] stated to her that they needed some information. She would like a call back.    Dodie: [phone]

## 2024-05-06 ENCOUNTER — TELEPHONE (OUTPATIENT)
Dept: FAMILY MEDICINE | Facility: CLINIC | Age: 54
End: 2024-05-06
Payer: MEDICARE

## 2024-05-06 DIAGNOSIS — I10 HYPERTENSION, UNSPECIFIED TYPE: ICD-10-CM

## 2024-05-06 RX ORDER — LISINOPRIL 40 MG/1
40 TABLET ORAL DAILY
Qty: 90 TABLET | Refills: 3 | Status: SHIPPED | OUTPATIENT
Start: 2024-05-06 | End: 2025-05-06

## 2024-05-06 RX ORDER — LISINOPRIL 40 MG/1
40 TABLET ORAL DAILY
Qty: 30 TABLET | Refills: 0 | Status: SHIPPED | OUTPATIENT
Start: 2024-05-06

## 2024-05-06 NOTE — TELEPHONE ENCOUNTER
----- Message from Angelina Barney sent at 5/6/2024  7:21 AM CDT -----  - 5/3-2:36 pm- mom ami is calling about his lisinopril. They are trying to change from [facility]. She does not want to keep going there. Jeffry needs one as he is out. Just needs 1 with no refills. Also [facility]

## 2024-05-06 NOTE — TELEPHONE ENCOUNTER
----- Message from Sue Mcduffie sent at 5/6/2024  4:09 PM CDT -----   2:32 Refill Lisinopril no refills. He needs a 30 day supply. [facility]. The patient did not leave a number. It was a saved message from 2:32 GH

## 2024-05-06 NOTE — TELEPHONE ENCOUNTER
----- Message from Bethanie Salvador sent at 5/6/2024  3:32 PM CDT -----  Vm: [phone]    Pt's mom called to switch his pharmacy to [facility] so he can get his medication mailed.    [phone]

## 2024-07-16 ENCOUNTER — TELEPHONE (OUTPATIENT)
Dept: FAMILY MEDICINE | Facility: CLINIC | Age: 54
End: 2024-07-16
Payer: MEDICARE

## 2024-07-16 NOTE — TELEPHONE ENCOUNTER
----- Message from Angelina Barney sent at 7/16/2024  9:06 AM CDT -----  - 9:03- pt is calling to find out if he needs labs before his next appt   [phone]

## 2024-07-30 ENCOUNTER — OFFICE VISIT (OUTPATIENT)
Dept: FAMILY MEDICINE | Facility: CLINIC | Age: 54
End: 2024-07-30
Payer: MEDICARE

## 2024-07-30 VITALS
DIASTOLIC BLOOD PRESSURE: 72 MMHG | BODY MASS INDEX: 29.77 KG/M2 | SYSTOLIC BLOOD PRESSURE: 122 MMHG | WEIGHT: 232 LBS | HEIGHT: 74 IN | OXYGEN SATURATION: 96 % | HEART RATE: 76 BPM

## 2024-07-30 DIAGNOSIS — E78.2 MIXED HYPERLIPIDEMIA: ICD-10-CM

## 2024-07-30 DIAGNOSIS — F20.89 OTHER SCHIZOPHRENIA: ICD-10-CM

## 2024-07-30 DIAGNOSIS — Z12.5 SPECIAL SCREENING FOR MALIGNANT NEOPLASM OF PROSTATE: ICD-10-CM

## 2024-07-30 DIAGNOSIS — R41.3 AMNESTIC DISORDER: ICD-10-CM

## 2024-07-30 DIAGNOSIS — I10 PRIMARY HYPERTENSION: Primary | ICD-10-CM

## 2024-07-30 PROCEDURE — 4010F ACE/ARB THERAPY RXD/TAKEN: CPT | Mod: CPTII,S$GLB,, | Performed by: FAMILY MEDICINE

## 2024-07-30 PROCEDURE — 1159F MED LIST DOCD IN RCRD: CPT | Mod: CPTII,S$GLB,, | Performed by: FAMILY MEDICINE

## 2024-07-30 PROCEDURE — 3078F DIAST BP <80 MM HG: CPT | Mod: CPTII,S$GLB,, | Performed by: FAMILY MEDICINE

## 2024-07-30 PROCEDURE — 99213 OFFICE O/P EST LOW 20 MIN: CPT | Mod: S$GLB,,, | Performed by: FAMILY MEDICINE

## 2024-07-30 PROCEDURE — 3074F SYST BP LT 130 MM HG: CPT | Mod: CPTII,S$GLB,, | Performed by: FAMILY MEDICINE

## 2024-07-30 PROCEDURE — 3008F BODY MASS INDEX DOCD: CPT | Mod: CPTII,S$GLB,, | Performed by: FAMILY MEDICINE

## 2024-07-30 RX ORDER — ACETAMINOPHEN, DIPHENHYDRAMINE HCL, PHENYLEPHRINE HCL 325; 25; 5 MG/1; MG/1; MG/1
10 TABLET ORAL
COMMUNITY

## 2024-08-02 NOTE — PROGRESS NOTES
SUBJECTIVE:    Patient ID: Jeffrey Pinon is a 54 y.o. male.    Chief Complaint: Follow-up (No bottles/Pt here for 6 mo follow up//JL)    54-year-old male here for six-month checkup.  He is feeling well and has had no complaints today.  He stays active by playing tennis once or twice a day.  Goes to the gym some days and works out with cardio and weights.  He denies chest pain or shortness a breath with activity    Has been diagnosed in the past with amnestic disorder and schizophrenia.  Followed by Dr. [facility].  Maintained on olanzapine and has been rather stable lately.    Blood pressure well controlled with lisinopril,    Hyperlipidemia, treated with rosuvastatin    Allergic rhinitis, Claritin as needed    Follow-up  Pertinent negatives include no abdominal pain, chest pain, chills, coughing, fatigue, fever, joint swelling, myalgias, nausea, numbness or vomiting.       No visits with results within 6 Month(s) from this visit.   Latest known visit with results is:   Office Visit on 01/30/2024   Component Date Value Ref Range Status    Glucose 07/19/2024 82  65 - 99 mg/dL Final    BUN 07/19/2024 16  7 - 25 mg/dL Final    Creatinine 07/19/2024 1.03  0.70 - 1.30 mg/dL Final    eGFR 07/19/2024 87  > OR = 60 mL/min/1.73m2 Final    BUN/Creatinine Ratio 07/19/2024 SEE NOTE:  6 - 22 (calc) Final    Sodium 07/19/2024 139  135 - 146 mmol/L Final    Potassium 07/19/2024 4.3  3.5 - 5.3 mmol/L Final    Chloride 07/19/2024 105  98 - 110 mmol/L Final    CO2 07/19/2024 26  20 - 32 mmol/L Final    Calcium 07/19/2024 9.0  8.6 - 10.3 mg/dL Final    Total Protein 07/19/2024 6.5  6.1 - 8.1 g/dL Final    Albumin 07/19/2024 4.4  3.6 - 5.1 g/dL Final    Globulin, Total 07/19/2024 2.1  1.9 - 3.7 g/dL (calc) Final    Albumin/Globulin Ratio 07/19/2024 2.1  1.0 - 2.5 (calc) Final    Total Bilirubin 07/19/2024 0.5  0.2 - 1.2 mg/dL Final    Alkaline Phosphatase 07/19/2024 41  35 - 144 U/L Final    AST 07/19/2024 12  10 - 35 U/L  Final    ALT 07/19/2024 13  9 - 46 U/L Final    Cholesterol 07/19/2024 178  <200 mg/dL Final    HDL 07/19/2024 56  > OR = 40 mg/dL Final    Triglycerides 07/19/2024 55  <150 mg/dL Final    LDL Cholesterol 07/19/2024 108 (H)  mg/dL (calc) Final    HDL/Cholesterol Ratio 07/19/2024 3.2  <5.0 (calc) Final    Non HDL Chol. (LDL+VLDL) 07/19/2024 122  <130 mg/dL (calc) Final       Past Medical History:   Diagnosis Date    Delusional disorder     Hypertension     Personal history of colonic polyps      Social History     Socioeconomic History    Marital status:    Tobacco Use    Smoking status: Never    Smokeless tobacco: Never   Substance and Sexual Activity    Alcohol use: No    Drug use: No     Past Surgical History:   Procedure Laterality Date    COLONOSCOPY W/ POLYPECTOMY  03/21/2023    Dr. Moctezuma-RTC 5 years     Family History   Problem Relation Name Age of Onset    Hypertension Father      Clotting disorder Father          Protein C deficiency       The 10-year CVD risk score (Padmini et al., 2008) is: 9%    Values used to calculate the score:      Age: 54 years      Sex: Male      Diabetic: No      Tobacco smoker: No      Systolic Blood Pressure: 122 mmHg      Is BP treated: Yes      HDL Cholesterol: 56 mg/dL      Total Cholesterol: 178 mg/dL    All of your core healthy metrics are met.      Review of patient's allergies indicates:   Allergen Reactions    Haldol [haloperidol lactate]     Warfarin        Current Outpatient Medications:     lisinopriL (PRINIVIL,ZESTRIL) 40 MG tablet, Take 1 tablet (40 mg total) by mouth once daily., Disp: 30 tablet, Rfl: 0    loratadine (CLARITIN) 10 mg tablet, Take 10 mg by mouth once daily., Disp: , Rfl:     melatonin 10 mg Tab, Take 10 mg by mouth., Disp: , Rfl:     OLANZapine (ZYPREXA) 10 MG tablet, Take 10 mg by mouth every evening., Disp: , Rfl:     rosuvastatin (CRESTOR) 10 MG tablet, Take 1 tablet (10 mg total) by mouth once daily., Disp: 90 tablet, Rfl: 3     "lisinopriL (PRINIVIL,ZESTRIL) 40 MG tablet, Take 1 tablet (40 mg total) by mouth once daily. (Patient not taking: Reported on 7/30/2024), Disp: 90 tablet, Rfl: 3    Review of Systems   Constitutional:  Negative for appetite change, chills, fatigue, fever and unexpected weight change.   HENT:  Negative for ear pain and trouble swallowing.    Eyes:  Negative for pain, discharge and visual disturbance.   Respiratory:  Negative for apnea, cough, shortness of breath and wheezing.    Cardiovascular:  Negative for chest pain and leg swelling.   Gastrointestinal:  Negative for abdominal pain, blood in stool, constipation, diarrhea, nausea, vomiting and reflux.   Endocrine: Negative for cold intolerance, heat intolerance and polydipsia.   Genitourinary:  Negative for bladder incontinence, dysuria, erectile dysfunction, frequency, hematuria, testicular pain and urgency.   Musculoskeletal:  Negative for gait problem, joint swelling and myalgias.   Neurological:  Negative for dizziness, seizures and numbness.   Psychiatric/Behavioral:  Negative for agitation, behavioral problems and hallucinations. The patient is not nervous/anxious.            Objective:      Vitals:    07/30/24 1515   BP: 122/72   Pulse: 76   SpO2: 96%   Weight: 105.2 kg (232 lb)   Height: 6' 2" (1.88 m)     Physical Exam  Vitals and nursing note reviewed.   Constitutional:       General: He is not in acute distress.     Appearance: He is well-developed. He is not toxic-appearing.   HENT:      Head: Normocephalic and atraumatic.      Right Ear: Tympanic membrane and external ear normal.      Left Ear: Tympanic membrane and external ear normal.      Nose: Nose normal.      Mouth/Throat:      Pharynx: Oropharynx is clear.   Eyes:      Pupils: Pupils are equal, round, and reactive to light.   Neck:      Thyroid: No thyromegaly.      Vascular: No carotid bruit.   Cardiovascular:      Rate and Rhythm: Normal rate and regular rhythm.      Heart sounds: Normal heart " sounds. No murmur heard.  Pulmonary:      Effort: Pulmonary effort is normal.      Breath sounds: Normal breath sounds. No wheezing or rales.   Abdominal:      General: Bowel sounds are normal. There is no distension.      Palpations: Abdomen is soft.      Tenderness: There is no abdominal tenderness.   Musculoskeletal:         General: No tenderness or deformity. Normal range of motion.      Cervical back: Normal range of motion and neck supple.      Lumbar back: Normal. No spasms.      Comments: Bends 90 degrees at  waist shoulders and knees good range of motion no pitting edema to lower extremities   Lymphadenopathy:      Cervical: No cervical adenopathy.   Skin:     General: Skin is warm and dry.      Findings: No rash.   Neurological:      General: No focal deficit present.      Mental Status: He is alert and oriented to person, place, and time. Mental status is at baseline.      Cranial Nerves: No cranial nerve deficit.      Coordination: Coordination normal.   Psychiatric:         Behavior: Behavior normal.         Thought Content: Thought content normal.         Judgment: Judgment normal.           Assessment:       1. Primary hypertension    2. Amnestic disorder    3. Other schizophrenia    4. Mixed hyperlipidemia         Plan:       Primary hypertension  Blood pressure well controlled with lisinopril  Amnestic disorder    Other schizophrenia  Continue lots of  Mixed hyperlipidemia  Continue rosuvastatin, cholesterol 178 HDL 56 TG 55  excellent lipid panel    Follow up in about 6 months (around 1/30/2025), or htn.        8/2/2024 Tristan Mae

## 2025-01-27 ENCOUNTER — TELEPHONE (OUTPATIENT)
Dept: FAMILY MEDICINE | Facility: CLINIC | Age: 55
End: 2025-01-27
Payer: MEDICARE

## 2025-01-27 NOTE — TELEPHONE ENCOUNTER
----- Message from Angelina sent at 1/27/2025 12:18 PM CST -----  Vm- 10:46-pt mom ami  is calling to see if he needs labs done before his visit   [phone]

## 2025-01-28 ENCOUNTER — TELEPHONE (OUTPATIENT)
Dept: FAMILY MEDICINE | Facility: CLINIC | Age: 55
End: 2025-01-28
Payer: MEDICARE

## 2025-02-04 ENCOUNTER — TELEPHONE (OUTPATIENT)
Dept: FAMILY MEDICINE | Facility: CLINIC | Age: 55
End: 2025-02-04
Payer: MEDICARE

## 2025-02-10 ENCOUNTER — OFFICE VISIT (OUTPATIENT)
Dept: FAMILY MEDICINE | Facility: CLINIC | Age: 55
End: 2025-02-10
Payer: MEDICARE

## 2025-02-10 VITALS
OXYGEN SATURATION: 98 % | WEIGHT: 228 LBS | SYSTOLIC BLOOD PRESSURE: 120 MMHG | HEIGHT: 74 IN | DIASTOLIC BLOOD PRESSURE: 80 MMHG | HEART RATE: 75 BPM | BODY MASS INDEX: 29.26 KG/M2

## 2025-02-10 DIAGNOSIS — R41.3 AMNESTIC DISORDER: ICD-10-CM

## 2025-02-10 DIAGNOSIS — Z00.00 WELLNESS EXAMINATION: Primary | ICD-10-CM

## 2025-02-10 DIAGNOSIS — F20.89 OTHER SCHIZOPHRENIA: ICD-10-CM

## 2025-02-10 DIAGNOSIS — I10 HYPERTENSION, UNSPECIFIED TYPE: ICD-10-CM

## 2025-02-10 DIAGNOSIS — E78.2 MIXED HYPERLIPIDEMIA: ICD-10-CM

## 2025-02-10 RX ORDER — ROSUVASTATIN CALCIUM 10 MG/1
10 TABLET, COATED ORAL DAILY
Qty: 90 TABLET | Refills: 3 | Status: SHIPPED | OUTPATIENT
Start: 2025-02-10 | End: 2026-02-10

## 2025-02-10 RX ORDER — LISINOPRIL 40 MG/1
40 TABLET ORAL DAILY
Qty: 90 TABLET | Refills: 3 | Status: SHIPPED | OUTPATIENT
Start: 2025-02-10

## 2025-02-10 NOTE — PROGRESS NOTES
SUBJECTIVE:    Patient ID: Jeffrey Pinon is a 54 y.o. male.    Chief Complaint: Hypertension (Went over meds verbally, need refills, declined vaccines,neck stiffness,, abc )    Hypertension  Pertinent negatives include no chest pain, headaches, neck pain or palpitations.       History of Present Illness    CHIEF COMPLAINT:  Jeffrey presents today for follow up.    ORTHOSTATIC HYPOTENSION:  He experiences mild dizziness when transitioning from lying to standing position. He denies dizziness during sports or weightlifting activities.    EXERCISE AND MUSCULOSKELETAL:  He plays tennis regularly, participating in 3-5 leagues and engages in weightlifting six times per week. He reports tight hamstrings with difficulty touching toes without slight knee bend.    SLEEP:  He gets 10-11 hours of sleep per night, with schedule from 8 PM to 6:30 AM. He takes Zyprexa and melatonin at night to aid with sleep.    Schizophrenia-managed by Dr. Los Rosa in [place].  Prescribed Zyprexa 10 mg HS and has been stable for years.    GI HISTORY:  He experiences rare episodes of GERD/reflux without medication management. He takes Miralax for Zyprexa-induced constipation with good effect. Colonoscopy in March 2023 revealed a polyp with family history of polyps, recommended 5-year follow-up.    CURRENT MEDICATIONS:  He takes lisinopril for blood pressure management and rosuvastatin for cholesterol control.      ROS:  Constitutional: -appetite change, -chills, -fatigue, -fever, -unexpected weight change  HENT: -ear pain, -trouble swallowing  Eyes: -pain, -discharge, -visual disturbance  Respiratory: -apnea, -cough, -shortness of breath, -wheezing  Cardiovascular: -chest pain, -leg swelling  Gastrointestinal: -abdominal pain, -blood in stool, +constipation, -diarrhea, -nausea, -vomiting, -reflux  Endocrine: -cold intolerance, -heat intolerance, -polydipsia  Genitourinary: -bladder incontinence, -dysuria, -erectile  dysfunction, -frequency, -hematuria, -testicular pain, -urgency, -nocturia  Musculoskeletal: -gait problem, -joint swelling, -myalgia  Neurological: +dizziness, -seizures, -numbness  Psychiatric/Behavioral: -agitation, -hallucinations, -nervous, -anxiety symptoms         No visits with results within 6 Month(s) from this visit.   Latest known visit with results is:   Office Visit on 07/30/2024   Component Date Value Ref Range Status    WBC 01/31/2025 4.6  3.8 - 10.8 Thousand/uL Final    RBC 01/31/2025 4.99  4.20 - 5.80 Million/uL Final    Hemoglobin 01/31/2025 15.1  13.2 - 17.1 g/dL Final    Hematocrit 01/31/2025 46.5  38.5 - 50.0 % Final    MCV 01/31/2025 93.2  80.0 - 100.0 fL Final    MCH 01/31/2025 30.3  27.0 - 33.0 pg Final    MCHC 01/31/2025 32.5  32.0 - 36.0 g/dL Final    RDW 01/31/2025 11.8  11.0 - 15.0 % Final    Platelets 01/31/2025 216  140 - 400 Thousand/uL Final    MPV 01/31/2025 9.4  7.5 - 12.5 fL Final    Neutrophils, Abs 01/31/2025 2,751  1,500 - 7,800 cells/uL Final    Lymph # 01/31/2025 1,385  850 - 3,900 cells/uL Final    Mono # 01/31/2025 313  200 - 950 cells/uL Final    Eos # 01/31/2025 92  15 - 500 cells/uL Final    Baso # 01/31/2025 60  0 - 200 cells/uL Final    Neutrophils Relative 01/31/2025 59.8  % Final    Lymph % 01/31/2025 30.1  % Final    Mono % 01/31/2025 6.8  % Final    Eosinophil % 01/31/2025 2.0  % Final    Basophil % 01/31/2025 1.3  % Final    Cholesterol 01/31/2025 171  <200 mg/dL Final    HDL 01/31/2025 56  > OR = 40 mg/dL Final    Triglycerides 01/31/2025 65  <150 mg/dL Final    LDL Cholesterol 01/31/2025 100 (H)  mg/dL (calc) Final    HDL/Cholesterol Ratio 01/31/2025 3.1  <5.0 (calc) Final    Non HDL Chol. (LDL+VLDL) 01/31/2025 115  <130 mg/dL (calc) Final    Glucose 01/31/2025 90  65 - 99 mg/dL Final    BUN 01/31/2025 17  7 - 25 mg/dL Final    Creatinine 01/31/2025 1.11  0.70 - 1.30 mg/dL Final    eGFR 01/31/2025 80  > OR = 60 mL/min/1.73m2 Final    BUN/Creatinine Ratio  01/31/2025 SEE NOTE:  6 - 22 (calc) Final    Sodium 01/31/2025 138  135 - 146 mmol/L Final    Potassium 01/31/2025 5.1  3.5 - 5.3 mmol/L Final    Chloride 01/31/2025 106  98 - 110 mmol/L Final    CO2 01/31/2025 26  20 - 32 mmol/L Final    Calcium 01/31/2025 8.8  8.6 - 10.3 mg/dL Final    Total Protein 01/31/2025 6.5  6.1 - 8.1 g/dL Final    Albumin 01/31/2025 4.3  3.6 - 5.1 g/dL Final    Globulin, Total 01/31/2025 2.2  1.9 - 3.7 g/dL (calc) Final    Albumin/Globulin Ratio 01/31/2025 2.0  1.0 - 2.5 (calc) Final    Total Bilirubin 01/31/2025 0.5  0.2 - 1.2 mg/dL Final    Alkaline Phosphatase 01/31/2025 41  35 - 144 U/L Final    AST 01/31/2025 15  10 - 35 U/L Final    ALT 01/31/2025 15  9 - 46 U/L Final    PROSTATE SPECIFIC ANTIGEN, SCR - Q* 01/31/2025 0.99  < OR = 4.00 ng/mL Final    TSH w/reflex to FT4 01/31/2025 1.31  0.40 - 4.50 mIU/L Final       Past Medical History:   Diagnosis Date    Delusional disorder     Hypertension     Personal history of colonic polyps      Social History     Socioeconomic History    Marital status:    Tobacco Use    Smoking status: Never    Smokeless tobacco: Never   Substance and Sexual Activity    Alcohol use: No    Drug use: No     Social Drivers of Health     Financial Resource Strain: Low Risk  (2/5/2025)    Overall Financial Resource Strain (CARDIA)     Difficulty of Paying Living Expenses: Not hard at all   Food Insecurity: No Food Insecurity (2/5/2025)    Hunger Vital Sign     Worried About Running Out of Food in the Last Year: Never true     Ran Out of Food in the Last Year: Never true   Physical Activity: Sufficiently Active (2/5/2025)    Exercise Vital Sign     Days of Exercise per Week: 6 days     Minutes of Exercise per Session: 120 min   Stress: No Stress Concern Present (2/5/2025)    [employer] - Occupational Stress Questionnaire     Feeling of Stress : Not at all   Housing Stability: Unknown (2/5/2025)    Housing Stability Vital Sign      Unable to Pay for Housing in the Last Year: No     Past Surgical History:   Procedure Laterality Date    COLONOSCOPY W/ POLYPECTOMY  03/21/2023    Dr. Moctezuma-RTC 5 years     Family History   Problem Relation Name Age of Onset    Hypertension Father      Clotting disorder Father          Protein C deficiency       The 10-year CVD risk score (Padmini et al., 2008) is: 8.4%    Values used to calculate the score:      Age: 54 years      Sex: Male      Diabetic: No      Tobacco smoker: No      Systolic Blood Pressure: 120 mmHg      Is BP treated: Yes      HDL Cholesterol: 56 mg/dL      Total Cholesterol: 171 mg/dL    All of your core healthy metrics are met.      Review of patient's allergies indicates:   Allergen Reactions    Haldol [haloperidol lactate]     Warfarin        Current Outpatient Medications:     lisinopriL (PRINIVIL,ZESTRIL) 40 MG tablet, Take 1 tablet (40 mg total) by mouth once daily., Disp: 90 tablet, Rfl: 3    loratadine (CLARITIN) 10 mg tablet, Take 10 mg by mouth once daily., Disp: , Rfl:     melatonin 10 mg Tab, Take 10 mg by mouth., Disp: , Rfl:     OLANZapine (ZYPREXA) 10 MG tablet, Take 10 mg by mouth every evening., Disp: , Rfl:     lisinopriL (PRINIVIL,ZESTRIL) 40 MG tablet, Take 1 tablet (40 mg total) by mouth once daily., Disp: 90 tablet, Rfl: 3    rosuvastatin (CRESTOR) 10 MG tablet, Take 1 tablet (10 mg total) by mouth once daily., Disp: 90 tablet, Rfl: 3    Review of Systems   Constitutional:  Negative for activity change and unexpected weight change.   HENT:  Negative for hearing loss, rhinorrhea and trouble swallowing.    Eyes:  Negative for discharge and visual disturbance.   Respiratory:  Negative for chest tightness and wheezing.    Cardiovascular:  Negative for chest pain and palpitations.   Gastrointestinal:  Positive for constipation (Taking MiraLax daily). Negative for blood in stool, diarrhea and vomiting.   Endocrine: Negative for polydipsia and polyuria.  "  Genitourinary:  Negative for difficulty urinating, hematuria and urgency.   Musculoskeletal:  Negative for arthralgias, joint swelling and neck pain.   Neurological:  Negative for weakness and headaches.   Psychiatric/Behavioral:  Negative for confusion and dysphoric mood.            Objective:      Vitals:    02/10/25 0725   BP: 120/80   Pulse: 75   SpO2: 98%   Weight: 103.4 kg (228 lb)   Height: 6' 2" (1.88 m)     Physical Exam  Vitals and nursing note reviewed.   Constitutional:       General: He is not in acute distress.     Appearance: Normal appearance. He is well-developed. He is not toxic-appearing.   HENT:      Head: Normocephalic and atraumatic.      Right Ear: Tympanic membrane and external ear normal.      Left Ear: Tympanic membrane and external ear normal.      Nose: Nose normal.      Mouth/Throat:      Pharynx: Oropharynx is clear. No posterior oropharyngeal erythema.   Eyes:      Pupils: Pupils are equal, round, and reactive to light.   Neck:      Thyroid: No thyromegaly.      Vascular: No carotid bruit.   Cardiovascular:      Rate and Rhythm: Normal rate and regular rhythm.      Heart sounds: Normal heart sounds. No murmur heard.  Pulmonary:      Effort: Pulmonary effort is normal.      Breath sounds: Normal breath sounds. No wheezing or rales.   Abdominal:      General: Bowel sounds are normal. There is no distension.      Palpations: Abdomen is soft.      Tenderness: There is no abdominal tenderness.   Musculoskeletal:         General: No tenderness or deformity. Normal range of motion.      Cervical back: Normal range of motion and neck supple.      Lumbar back: Normal. No spasms.      Comments: Bends 90 degrees at  waist, shoulders and knees have full range of motion, no pitting edema to lower extremities   Lymphadenopathy:      Cervical: No cervical adenopathy.   Skin:     General: Skin is warm and dry.      Findings: No rash.   Neurological:      General: No focal deficit present.      " Mental Status: He is alert and oriented to person, place, and time. Mental status is at baseline.      Cranial Nerves: No cranial nerve deficit.      Coordination: Coordination normal.   Psychiatric:         Mood and Affect: Mood normal.         Behavior: Behavior normal.         Thought Content: Thought content normal.         Judgment: Judgment normal.       Physical Exam    Musculoskeletal: Right hamstring tension. Right hamstring tightness.  Vitals: Blood pressure: 120/80.             Assessment:       1. Wellness examination    2. Hypertension, unspecified type    3. Mixed hyperlipidemia    4. Other schizophrenia    5. Amnestic disorder         Plan:       Wellness examination  Wellness labs reviewed with patient at this visit.  Hypertension, unspecified type  -     lisinopriL (PRINIVIL,ZESTRIL) 40 MG tablet; Take 1 tablet (40 mg total) by mouth once daily.  Dispense: 90 tablet; Refill: 3  Blood pressure well controlled, cautioned patient if blood pressure drops to 100 systolic we will reduce lisinopril to 20 mg  Mixed hyperlipidemia  -     rosuvastatin (CRESTOR) 10 MG tablet; Take 1 tablet (10 mg total) by mouth once daily.  Dispense: 90 tablet; Refill: 3  Cholesterol 171 HDL 56 TG 65   Other schizophrenia  Stable on Zyprexa  Amnestic disorder      Assessment & Plan    IMPRESSION:  - Patient in good health, maintaining active lifestyle with tennis and weightlifting  - Cholesterol levels improved, with total cholesterol decreasing from 178 to 171  - Continued psychiatric medication regimen  - Assessed blood pressure control; BP reading of 120/80 indicates adequate control despite occasional dizziness when rising from lying position  - Evaluated musculoskeletal health; no signs of rotator cuff injury or knee problems    OTHER SCHIZOPHRENIA:  - Continue Zyprexa at current dose, to be taken at night.  - Jeffrey reported stable condition with no changes in medication dosage for an extended period.  -  Jeffrey is sleeping well, getting 10-11 hours of sleep per night.  - Prescribed melatonin in addition to Zyprexa for sleep management.  - To address Zyprexa-induced constipation, prescribed Miralax.  - Inquired about the patient's current psychiatric care.    HYPERTENSION:  - Continued lisinopril with a 90-day supply and multiple refills through [facility].  - Blood pressure monitored at 120/80, evaluated as good.  - Jeffrey reported occasional dizziness when rising from a lying position.  - Discussed possibility of adjusting medication dosage if needed.    HYPERLIPIDEMIA:  - Continued rosuvastatin with a 90-day supply and multiple refills through [facility].  - Recent lab work showed improved cholesterol levels, now in a very good range.  - Acknowledged patient's healthy lifestyle contributing to improvement.    GASTROESOPHAGEAL REFLUX DISEASE (GERD):  - Jeffrey reported occasional heartburn and reflux, evaluated as rare.  - Recommend OTC antacids for infrequent symptoms.  - Discussed treatment options based on symptom frequency.    COLON POLYPS:  - Scheduled follow-up colonoscopy for 2028, five years from the last procedure in March 2023.  - Noted previous colonoscopy two years ago revealed polyps.  - Acknowledged family history of polyps and recommended continued monitoring.    SHINGLES VACCINATION:  - Confirmed patient has received shingles vaccination due to previous shingles infection.    PSYCHIATRIC CARE:  - Homerer under psychiatric care with Dr. Los Reyes.    FOLLOW UP:  - Evaluated patient's overall condition as stable.         Follow up in about 6 months (around 8/10/2025), or htn hld.        This note was generated with the assistance of ambient listening technology. Verbal consent was obtained by the patient and accompanying visitor(s) for the recording of patient appointment to facilitate this note. I attest to having reviewed and edited the generated note for accuracy,  though some syntax or spelling errors may persist. Please contact the author of this note for any clarification.      2/10/2025 Tristan Mae

## 2025-07-28 ENCOUNTER — TELEPHONE (OUTPATIENT)
Dept: FAMILY MEDICINE | Facility: CLINIC | Age: 55
End: 2025-07-28
Payer: MEDICARE

## 2025-07-28 DIAGNOSIS — E78.2 MIXED HYPERLIPIDEMIA: ICD-10-CM

## 2025-07-28 DIAGNOSIS — Z79.899 ENCOUNTER FOR LONG-TERM (CURRENT) USE OF MEDICATIONS: Primary | ICD-10-CM

## 2025-07-28 DIAGNOSIS — I10 HYPERTENSION, UNSPECIFIED TYPE: ICD-10-CM

## 2025-07-28 NOTE — TELEPHONE ENCOUNTER
Left message to call me back, comm consent, so that I can remind him fasting lab and urine are due a week prior to visit, orders at [facility], encouraged water

## 2025-07-29 NOTE — TELEPHONE ENCOUNTER
Message  Received: Yesterday  Jenny Coombs LPN [place]phal, Melissa, RT  Caller: Unspecified (Yesterday,  4:31 PM)         Previous Messages       ----- Message -----  From: Bethanie Salvador  Sent: 7/28/2025   4:32 PM CDT  To: Tristan Mae Staff    Vm: [phone]    Pt is returning a call.    [phone]

## 2025-08-01 LAB
ALBUMIN SERPL-MCNC: 4.5 G/DL (ref 3.6–5.1)
ALBUMIN/CREAT UR: NORMAL MG/G CREAT
ALBUMIN/GLOB SERPL: 1.7 (CALC) (ref 1–2.5)
ALP SERPL-CCNC: 45 U/L (ref 35–144)
ALT SERPL-CCNC: 14 U/L (ref 9–46)
APPEARANCE UR: CLEAR
AST SERPL-CCNC: 14 U/L (ref 10–35)
BACTERIA #/AREA URNS HPF: NORMAL /HPF
BACTERIA UR CULT: NORMAL
BILIRUB SERPL-MCNC: 0.5 MG/DL (ref 0.2–1.2)
BILIRUB UR QL STRIP: NEGATIVE
BUN SERPL-MCNC: 16 MG/DL (ref 7–25)
BUN/CREAT SERPL: NORMAL (CALC) (ref 6–22)
CALCIUM SERPL-MCNC: 9.1 MG/DL (ref 8.6–10.3)
CHLORIDE SERPL-SCNC: 101 MMOL/L (ref 98–110)
CHOLEST SERPL-MCNC: 154 MG/DL
CHOLEST/HDLC SERPL: 2.9 (CALC)
CO2 SERPL-SCNC: 27 MMOL/L (ref 20–32)
COLOR UR: YELLOW
CREAT SERPL-MCNC: 1.11 MG/DL (ref 0.7–1.3)
CREAT UR-MCNC: 80 MG/DL (ref 20–320)
EGFR: 79 ML/MIN/1.73M2
GLOBULIN SER CALC-MCNC: 2.6 G/DL (CALC) (ref 1.9–3.7)
GLUCOSE SERPL-MCNC: 78 MG/DL (ref 65–99)
GLUCOSE UR QL STRIP: NEGATIVE
HDLC SERPL-MCNC: 54 MG/DL
HGB UR QL STRIP: NEGATIVE
HYALINE CASTS #/AREA URNS LPF: NORMAL /LPF
KETONES UR QL STRIP: NEGATIVE
LDLC SERPL CALC-MCNC: 84 MG/DL (CALC)
LEUKOCYTE ESTERASE UR QL STRIP: NEGATIVE
MICROALBUMIN UR-MCNC: <0.2 MG/DL
NITRITE UR QL STRIP: NEGATIVE
NONHDLC SERPL-MCNC: 100 MG/DL (CALC)
PH UR STRIP: 7.5 [PH] (ref 5–8)
POTASSIUM SERPL-SCNC: 5 MMOL/L (ref 3.5–5.3)
PROT SERPL-MCNC: 7.1 G/DL (ref 6.1–8.1)
PROT UR QL STRIP: NEGATIVE
RBC #/AREA URNS HPF: NORMAL /HPF
SERVICE CMNT-IMP: NORMAL
SODIUM SERPL-SCNC: 138 MMOL/L (ref 135–146)
SP GR UR STRIP: 1.01 (ref 1–1.03)
SQUAMOUS #/AREA URNS HPF: NORMAL /HPF
TRIGL SERPL-MCNC: 70 MG/DL
WBC #/AREA URNS HPF: NORMAL /HPF

## 2025-08-11 ENCOUNTER — OFFICE VISIT (OUTPATIENT)
Dept: FAMILY MEDICINE | Facility: CLINIC | Age: 55
End: 2025-08-11
Payer: MEDICARE

## 2025-08-11 VITALS — BODY MASS INDEX: 29.26 KG/M2 | OXYGEN SATURATION: 99 % | HEART RATE: 63 BPM | WEIGHT: 228 LBS | HEIGHT: 74 IN

## 2025-08-11 DIAGNOSIS — E78.2 MIXED HYPERLIPIDEMIA: ICD-10-CM

## 2025-08-11 DIAGNOSIS — F20.89 OTHER SCHIZOPHRENIA: ICD-10-CM

## 2025-08-11 DIAGNOSIS — I10 HYPERTENSION, UNSPECIFIED TYPE: Primary | ICD-10-CM

## 2025-08-11 DIAGNOSIS — R41.3 AMNESTIC DISORDER: ICD-10-CM

## 2025-08-11 DIAGNOSIS — Z12.5 SPECIAL SCREENING FOR MALIGNANT NEOPLASM OF PROSTATE: ICD-10-CM

## 2025-08-11 PROCEDURE — 3061F NEG MICROALBUMINURIA REV: CPT | Mod: CPTII,S$GLB,, | Performed by: FAMILY MEDICINE

## 2025-08-11 PROCEDURE — 3066F NEPHROPATHY DOC TX: CPT | Mod: CPTII,S$GLB,, | Performed by: FAMILY MEDICINE

## 2025-08-11 PROCEDURE — 99214 OFFICE O/P EST MOD 30 MIN: CPT | Mod: S$GLB,,, | Performed by: FAMILY MEDICINE

## 2025-08-11 PROCEDURE — 1159F MED LIST DOCD IN RCRD: CPT | Mod: CPTII,S$GLB,, | Performed by: FAMILY MEDICINE

## 2025-08-11 PROCEDURE — 4010F ACE/ARB THERAPY RXD/TAKEN: CPT | Mod: CPTII,S$GLB,, | Performed by: FAMILY MEDICINE

## 2025-08-11 PROCEDURE — 3008F BODY MASS INDEX DOCD: CPT | Mod: CPTII,S$GLB,, | Performed by: FAMILY MEDICINE

## 2025-08-11 RX ORDER — LISINOPRIL 40 MG/1
40 TABLET ORAL DAILY
Qty: 90 TABLET | Refills: 3 | Status: SHIPPED | OUTPATIENT
Start: 2025-08-11

## 2025-08-11 RX ORDER — ROSUVASTATIN CALCIUM 10 MG/1
10 TABLET, COATED ORAL DAILY
Qty: 90 TABLET | Refills: 3 | Status: SHIPPED | OUTPATIENT
Start: 2025-08-11 | End: 2026-08-11

## 2025-08-25 DIAGNOSIS — Z00.00 ENCOUNTER FOR MEDICARE ANNUAL WELLNESS EXAM: ICD-10-CM
